# Patient Record
Sex: MALE | Race: WHITE | NOT HISPANIC OR LATINO
[De-identification: names, ages, dates, MRNs, and addresses within clinical notes are randomized per-mention and may not be internally consistent; named-entity substitution may affect disease eponyms.]

---

## 2020-09-22 ENCOUNTER — TRANSCRIPTION ENCOUNTER (OUTPATIENT)
Age: 65
End: 2020-09-22

## 2020-09-23 ENCOUNTER — INPATIENT (INPATIENT)
Facility: HOSPITAL | Age: 65
LOS: 6 days | Discharge: HOME | DRG: 330 | End: 2020-09-30
Attending: INTERNAL MEDICINE | Admitting: INTERNAL MEDICINE
Payer: MEDICAID

## 2020-09-23 VITALS
DIASTOLIC BLOOD PRESSURE: 88 MMHG | TEMPERATURE: 99 F | SYSTOLIC BLOOD PRESSURE: 149 MMHG | OXYGEN SATURATION: 99 % | HEART RATE: 87 BPM | RESPIRATION RATE: 16 BRPM

## 2020-09-23 DIAGNOSIS — K62.5 HEMORRHAGE OF ANUS AND RECTUM: ICD-10-CM

## 2020-09-23 LAB
ALBUMIN SERPL ELPH-MCNC: 3.7 G/DL — SIGNIFICANT CHANGE UP (ref 3.5–5)
ALP SERPL-CCNC: 65 U/L — SIGNIFICANT CHANGE UP (ref 40–120)
ALT FLD-CCNC: 19 U/L DA — SIGNIFICANT CHANGE UP (ref 10–60)
ANION GAP SERPL CALC-SCNC: 6 MMOL/L — SIGNIFICANT CHANGE UP (ref 5–17)
APTT BLD: 26.6 SEC — LOW (ref 27.5–35.5)
AST SERPL-CCNC: 16 U/L — SIGNIFICANT CHANGE UP (ref 10–40)
BASOPHILS # BLD AUTO: 0.02 K/UL — SIGNIFICANT CHANGE UP (ref 0–0.2)
BASOPHILS NFR BLD AUTO: 0.2 % — SIGNIFICANT CHANGE UP (ref 0–2)
BILIRUB SERPL-MCNC: 0.3 MG/DL — SIGNIFICANT CHANGE UP (ref 0.2–1.2)
BLD GP AB SCN SERPL QL: SIGNIFICANT CHANGE UP
BUN SERPL-MCNC: 12 MG/DL — SIGNIFICANT CHANGE UP (ref 7–18)
CALCIUM SERPL-MCNC: 9.2 MG/DL — SIGNIFICANT CHANGE UP (ref 8.4–10.5)
CHLORIDE SERPL-SCNC: 101 MMOL/L — SIGNIFICANT CHANGE UP (ref 96–108)
CO2 SERPL-SCNC: 26 MMOL/L — SIGNIFICANT CHANGE UP (ref 22–31)
CREAT SERPL-MCNC: 0.96 MG/DL — SIGNIFICANT CHANGE UP (ref 0.5–1.3)
EOSINOPHIL # BLD AUTO: 0.07 K/UL — SIGNIFICANT CHANGE UP (ref 0–0.5)
EOSINOPHIL NFR BLD AUTO: 0.8 % — SIGNIFICANT CHANGE UP (ref 0–6)
GLUCOSE BLDC GLUCOMTR-MCNC: 141 MG/DL — HIGH (ref 70–99)
GLUCOSE SERPL-MCNC: 165 MG/DL — HIGH (ref 70–99)
HCT VFR BLD CALC: 37.5 % — LOW (ref 39–50)
HGB BLD-MCNC: 12.6 G/DL — LOW (ref 13–17)
IMM GRANULOCYTES NFR BLD AUTO: 0.2 % — SIGNIFICANT CHANGE UP (ref 0–1.5)
INR BLD: 1.04 RATIO — SIGNIFICANT CHANGE UP (ref 0.88–1.16)
LYMPHOCYTES # BLD AUTO: 1.67 K/UL — SIGNIFICANT CHANGE UP (ref 1–3.3)
LYMPHOCYTES # BLD AUTO: 18.2 % — SIGNIFICANT CHANGE UP (ref 13–44)
MCHC RBC-ENTMCNC: 30.6 PG — SIGNIFICANT CHANGE UP (ref 27–34)
MCHC RBC-ENTMCNC: 33.6 GM/DL — SIGNIFICANT CHANGE UP (ref 32–36)
MCV RBC AUTO: 91 FL — SIGNIFICANT CHANGE UP (ref 80–100)
MONOCYTES # BLD AUTO: 0.83 K/UL — SIGNIFICANT CHANGE UP (ref 0–0.9)
MONOCYTES NFR BLD AUTO: 9.1 % — SIGNIFICANT CHANGE UP (ref 2–14)
NEUTROPHILS # BLD AUTO: 6.56 K/UL — SIGNIFICANT CHANGE UP (ref 1.8–7.4)
NEUTROPHILS NFR BLD AUTO: 71.5 % — SIGNIFICANT CHANGE UP (ref 43–77)
NRBC # BLD: 0 /100 WBCS — SIGNIFICANT CHANGE UP (ref 0–0)
PLATELET # BLD AUTO: 241 K/UL — SIGNIFICANT CHANGE UP (ref 150–400)
POTASSIUM SERPL-MCNC: 4.3 MMOL/L — SIGNIFICANT CHANGE UP (ref 3.5–5.3)
POTASSIUM SERPL-SCNC: 4.3 MMOL/L — SIGNIFICANT CHANGE UP (ref 3.5–5.3)
PROT SERPL-MCNC: 7.1 G/DL — SIGNIFICANT CHANGE UP (ref 6–8.3)
PROTHROM AB SERPL-ACNC: 12.1 SEC — SIGNIFICANT CHANGE UP (ref 10.6–13.6)
RBC # BLD: 4.12 M/UL — LOW (ref 4.2–5.8)
RBC # FLD: 13 % — SIGNIFICANT CHANGE UP (ref 10.3–14.5)
SARS-COV-2 RNA SPEC QL NAA+PROBE: SIGNIFICANT CHANGE UP
SODIUM SERPL-SCNC: 133 MMOL/L — LOW (ref 135–145)
WBC # BLD: 9.17 K/UL — SIGNIFICANT CHANGE UP (ref 3.8–10.5)
WBC # FLD AUTO: 9.17 K/UL — SIGNIFICANT CHANGE UP (ref 3.8–10.5)

## 2020-09-23 PROCEDURE — 74177 CT ABD & PELVIS W/CONTRAST: CPT | Mod: 26

## 2020-09-23 PROCEDURE — 99285 EMERGENCY DEPT VISIT HI MDM: CPT

## 2020-09-23 RX ORDER — SOD SULF/SODIUM/NAHCO3/KCL/PEG
4000 SOLUTION, RECONSTITUTED, ORAL ORAL ONCE
Refills: 0 | Status: DISCONTINUED | OUTPATIENT
Start: 2020-09-23 | End: 2020-09-23

## 2020-09-23 RX ORDER — SODIUM CHLORIDE 9 MG/ML
1000 INJECTION, SOLUTION INTRAVENOUS
Refills: 0 | Status: DISCONTINUED | OUTPATIENT
Start: 2020-09-23 | End: 2020-09-25

## 2020-09-23 RX ORDER — SODIUM CHLORIDE 9 MG/ML
1000 INJECTION INTRAMUSCULAR; INTRAVENOUS; SUBCUTANEOUS ONCE
Refills: 0 | Status: COMPLETED | OUTPATIENT
Start: 2020-09-23 | End: 2020-09-23

## 2020-09-23 RX ORDER — INSULIN LISPRO 100/ML
VIAL (ML) SUBCUTANEOUS EVERY 6 HOURS
Refills: 0 | Status: DISCONTINUED | OUTPATIENT
Start: 2020-09-23 | End: 2020-09-25

## 2020-09-23 RX ORDER — SOD SULF/SODIUM/NAHCO3/KCL/PEG
4000 SOLUTION, RECONSTITUTED, ORAL ORAL ONCE
Refills: 0 | Status: COMPLETED | OUTPATIENT
Start: 2020-09-23 | End: 2020-09-23

## 2020-09-23 RX ADMIN — SODIUM CHLORIDE 80 MILLILITER(S): 9 INJECTION, SOLUTION INTRAVENOUS at 22:10

## 2020-09-23 RX ADMIN — Medication 4000 MILLILITER(S): at 18:53

## 2020-09-23 RX ADMIN — Medication 20 MILLIGRAM(S): at 22:10

## 2020-09-23 RX ADMIN — SODIUM CHLORIDE 1000 MILLILITER(S): 9 INJECTION INTRAMUSCULAR; INTRAVENOUS; SUBCUTANEOUS at 10:47

## 2020-09-23 NOTE — H&P ADULT - PROBLEM SELECTOR PLAN 3
With hgb of 12.6  Likely iron def from bleed vs AOCD from underlying malignancy  Follow up iron panel

## 2020-09-23 NOTE — H&P ADULT - PROBLEM SELECTOR PLAN 1
Presented with BRBPR and found with mass in sigmoid colon. Also with anemia.  GI, Dr. Montez was consulted. Patient was started on bowel prep for colonoscopy.   - Colonoscopy scheduled for tomorrow 9/24/2020

## 2020-09-23 NOTE — CONSULT NOTE ADULT - SUBJECTIVE AND OBJECTIVE BOX
GI INITIAL CONSULT    HPI:   66 YO M with DM 2 presents with rectal bleeding that started this AM. Stated he had a BM this morning where he noted pink tinged blood in his stool, did not make nothing of it and went to work. At work while pt was urinating he noticed blood leaking from his rectum in small amounts and decided to come to the ER. Pt never had an EGD or colonoscopy, denies N/V/D, constipation.     PMH: DM 2  PSH: none     Meds:   MEDICATIONS  (STANDING):  bisacodyl 20 milliGRAM(s) Oral at bedtime  polyethylene glycol/electrolyte Solution. 4000 milliLiter(s) Oral once    SH: non contributory   FH: non contributory    ROS:  CONSTITUTIONAL: No fever, weight loss, or fatigue  EYES: No eye pain, visual disturbances, or discharge  ENT:  No difficulty hearing, tinnitus, vertigo; No sinus or throat pain  NECK: No pain or stiffness  RESPIRATORY: No cough, wheezing, chills or hemoptysis, shortness of Breath  CARDIOVASCULAR: No chest pain, palpitations, passing out, dizziness, or leg swelling  GASTROINTESTINAL: see HPI  GENITOURINARY: No dysuria, frequency, hematuria, or incontinence  NEUROLOGICAL: No headaches, memory loss, loss of strength, numbness, or tremors  SKIN: No itching, burning, rashes, or lesions   MUSCULOSKELETAL: No arthralgia, myalgia, or back pain.     Vitals:  Vital Signs Last 24 Hrs  T(C): 36.8 (23 Sep 2020 16:15), Max: 37.1 (23 Sep 2020 09:58)  T(F): 98.3 (23 Sep 2020 16:15), Max: 98.8 (23 Sep 2020 09:58)  HR: 84 (23 Sep 2020 16:15) (62 - 87)  BP: 142/95 (23 Sep 2020 16:16) (128/80 - 157/101)     Gen: NAD  CVS: S1/S2  Chest: CTABL  Abd: soft, non tender, non distended                          12.6   9.17  )-----------( 241      ( 23 Sep 2020 10:48 )             37.5   09-23    133<L>  |  101  |  12  ----------------------------<  165<H>  4.3   |  26  |  0.96    Ca    9.2      23 Sep 2020 10:48    TPro  7.1  /  Alb  3.7  /  TBili  0.3  /  DBili  x   /  AST  16  /  ALT  19  /  AlkPhos  65  09-23      Imaging:  EXAM:  CT ABDOMEN AND PELVIS IC                        PROCEDURE DATE:  09/23/2020      Clinical Indication: rectal bleeding    Technique: Axial multidetector CT images of the abdomen and pelvis are acquired following the administration of IV contrast (90 cc Omnipaque-350 administered, 10 cc discarded).    Comparison: None.    Findings: Limited sections through the lung bases demonstrate mild dependent pleural parenchymal changes bilaterally.    The liver, gallbladder, common bile duct, pancreas, spleen, and the right adrenal appear unremarkable. Nonspecific small 1.4 cm left adrenal nodule.    There is a 1.9 cm indeterminate hypodense lesion with a Hounsfield unit of 29 in the left kidney. There are other tiny hypodense lesions in the kidneys bilaterally, too small to characterize. No evidence for a ureteral calculus. No hydronephrosis.    The appendix appears normal. Apparent 4.2 cm lobulated masslike structure in the sigmoid colon (image 93 series 2). No bowel obstruction, or grossly thickened bowel wall.    Small fat-containing periumbilical hernia.    No evidence of free air, ascites, or enlarged lymph node.    The urinary bladder is within normal limits. The prostate appears grossly unremarkable.    Impression:    Apparent 4.2 cm lobulated masslike structure in the sigmoid colon. Colonoscopy is recommended to rule out a colon cancer.    Indeterminate 1.9 cm hypodense left renal lesion. If clinically indicated, renal ultrasound may be pursued for further evaluation.

## 2020-09-23 NOTE — ED PROVIDER NOTE - ATTENDING CONTRIBUTION TO CARE
I was physically present for the E/M service provided. I agree with above history, physical, and plan which I have reviewed and edited where appropriate. I was physically present for the key portions of the service provided.    Yesenia: 65 yr old male with hx of DM presents to ed c/o BRBPR, painless today x 2. noted in stool and when he wiped. no ac use, no vomiting, no abd pain ,no fever, no diarrhea, no easy brusing, no drug use.    *GEN:   comfortable, in no apparent distress, AOx3  *EYES:   PERRL, extra-occular movements intact, non-pale  *CV:   regular rate and rhythm, normal S1/S2, no murmur  *RESP:   clear to auscultation bilaterally, non-labored, speaking in full sentences  *ABD:   soft, non tender, no guarding, rectal per res  *SKIN:   dry, intact, no rash  *NEURO:   AOx3, no focal weakness or loss of sensation, gait normal, GCS 15    a/p: BRBPR r/o diverticulosis vs avm unlikely UGI. labs, ct, re-assess

## 2020-09-23 NOTE — ED PROVIDER NOTE - CLINICAL SUMMARY MEDICAL DECISION MAKING FREE TEXT BOX
65yoM pmh of DM c/o BRBPR since this morning not on blood thinners. Hemodynamically stable not currently bleeding. Likely 2/2 to internal hemorrhoids vs diverticular bleed. Unlikely UGI bleed based on HPI and exam and no other risk factors. No signs or symptoms of infection. Will start IVF, obtain basic labs, CT abdomen.

## 2020-09-23 NOTE — H&P ADULT - NSHPPHYSICALEXAM_GEN_ALL_CORE
General - Well appearing, well nourished, overweight, NAD with son at bedside  Eyes - PERRLA, EOM intact  ENT - Moist mucous membranes   Neck - No noticeable or palpable swelling, redness or rash around throat or on face  Lymph Nodes - No lymphadenopathy  Cardiovascular - +s1/s2 regular  Lungs - Clear to ascultation, no use of accessory muscles, no crackles or wheezes.  Skin - No rashes, skin warm and dry, no erythematous areas  Abdomen - Normal bowel sounds, abdomen soft and nontender  Rectal – No hemorroids noted, no blood on ASHLEY  Extremities - No edema, cyanosis or clubbing  Musculoskeletal - 5/5 strength, normal range of motion, no swollen or erythematous  joints.  Neurological – Alert and oriented x 3, CN 2-12 grossly intact.

## 2020-09-23 NOTE — CONSULT NOTE ADULT - ASSESSMENT
64 YO M present with rectal bleeding, never had a colonoscopy, CT scan today showed  4.2 cm lobulated masslike structure in the sigmoid colon, hgh concern for colon cancer    #rectal bleed, mass in sigmoid colon  - pt has a high concern for colon cancer   - get CEA level    - scheduled for colonoscopy tomorrow 9am  - clear diet for the remainder of day today   - 2 tab dulcolax tonight   - golytly tonight   - NPO after midnight

## 2020-09-23 NOTE — H&P ADULT - ASSESSMENT
Patient is a 65 year old male with PMHx of diabetes presenting with bright red blood per rectum and CT findings of 4.2cm mass in the sigmoid colon concerning for malignancy. Admitted for further workup including colonoscopy.

## 2020-09-23 NOTE — ED ADULT NURSE NOTE - NSIMPLEMENTINTERV_GEN_ALL_ED
Implemented All Universal Safety Interventions:  [place] to call system. Call bell, personal items and telephone within reach. Instruct patient to call for assistance. Room bathroom lighting operational. Non-slip footwear when patient is off stretcher. Physically safe environment: no spills, clutter or unnecessary equipment. Stretcher in lowest position, wheels locked, appropriate side rails in place.

## 2020-09-23 NOTE — ED PROVIDER NOTE - OBJECTIVE STATEMENT
65yoM PMH of DM c/o rectal bleeding. Pt had normal BM this morning, noticed blood streaks in the stool. Stool was normal in color, and patient did not have constipation or diarrhea. Patient went to work and when he was standing to urinate, noticed blood dripping from the rectum. He then decided to come to the hospital. This is the first time this has ever happened to him. He denies any lightheadedness, dizziness, fever, chills, nausea, vomiting, abdominal pain. Pt has never had a colonoscopy.

## 2020-09-23 NOTE — H&P ADULT - HISTORY OF PRESENT ILLNESS
Patient is a 65 year old male with PMHx of diabetes, extensive smoking history presenting with chief complaint of bright red blood per rectum. Patient first noticed blood when he was wiping after a bowel movement and noted red streaks. Later on in the day, patient noted red blood dripping from his rectum while urinating. Patient has no other complaints. Denied fever, chills cough, SOB, chest pain, abdominal pain, constipation, diarrhea, change in stool consistency, weight loss.    IN the ED, patient was hemodynamically stable, hemoglobin of 12.6. Patient noted to have a bowel movement in the ED that turned the toilet water red. CT abd was done that showed a mass in the sigmoid colon. GI, Dr. Montez was consulted and patient was started on bowel prep for colonoscopy.

## 2020-09-23 NOTE — H&P ADULT - PROBLEM SELECTOR PLAN 4
IMPROVE VTE score: 1  Will manage with: Hold anticoagulation in the setting of ongoing GI Bleed.    [ ] Previous VTE                                    3  [ ] Thrombophilia                                  2  [] Lower limb paralysis                        2  (unable to hold up >15 seconds)    [ ] Current Cancer (within 6 months)        2   [] Immobilization > 24 hrs                    1  [ ] ICU/CCU stay > 24 hrs                      1  [x] Age > 60                                         1

## 2020-09-23 NOTE — ED PROVIDER NOTE - PROGRESS NOTE DETAILS
Pt informed of CT scan results 4cm mass in the sigmoid colon. Pt initially wanted to go home 2/2 to not having insurance. Agreeable to staying after explaining risks of mass.

## 2020-09-23 NOTE — H&P ADULT - PROBLEM SELECTOR PLAN 2
Hx of DM on Metformin 1000mg BID.  - Hold oral medications, started on insulin sliding scale.  Follow up hgb a1c

## 2020-09-24 ENCOUNTER — RESULT REVIEW (OUTPATIENT)
Age: 65
End: 2020-09-24

## 2020-09-24 DIAGNOSIS — K63.89 OTHER SPECIFIED DISEASES OF INTESTINE: ICD-10-CM

## 2020-09-24 DIAGNOSIS — Z29.9 ENCOUNTER FOR PROPHYLACTIC MEASURES, UNSPECIFIED: ICD-10-CM

## 2020-09-24 DIAGNOSIS — D64.9 ANEMIA, UNSPECIFIED: ICD-10-CM

## 2020-09-24 DIAGNOSIS — E11.9 TYPE 2 DIABETES MELLITUS WITHOUT COMPLICATIONS: ICD-10-CM

## 2020-09-24 LAB
A1C WITH ESTIMATED AVERAGE GLUCOSE RESULT: 7.4 % — HIGH (ref 4–5.6)
ANION GAP SERPL CALC-SCNC: 6 MMOL/L — SIGNIFICANT CHANGE UP (ref 5–17)
BLD GP AB SCN SERPL QL: SIGNIFICANT CHANGE UP
BUN SERPL-MCNC: 16 MG/DL — SIGNIFICANT CHANGE UP (ref 7–18)
CALCIUM SERPL-MCNC: 9.1 MG/DL — SIGNIFICANT CHANGE UP (ref 8.4–10.5)
CEA SERPL-MCNC: 2.9 NG/ML — SIGNIFICANT CHANGE UP (ref 0–3.8)
CHLORIDE SERPL-SCNC: 106 MMOL/L — SIGNIFICANT CHANGE UP (ref 96–108)
CO2 SERPL-SCNC: 30 MMOL/L — SIGNIFICANT CHANGE UP (ref 22–31)
CREAT SERPL-MCNC: 0.9 MG/DL — SIGNIFICANT CHANGE UP (ref 0.5–1.3)
ESTIMATED AVERAGE GLUCOSE: 166 MG/DL — HIGH (ref 68–114)
FERRITIN SERPL-MCNC: 110 NG/ML — SIGNIFICANT CHANGE UP (ref 30–400)
GLUCOSE BLDC GLUCOMTR-MCNC: 108 MG/DL — HIGH (ref 70–99)
GLUCOSE BLDC GLUCOMTR-MCNC: 129 MG/DL — HIGH (ref 70–99)
GLUCOSE BLDC GLUCOMTR-MCNC: 140 MG/DL — HIGH (ref 70–99)
GLUCOSE BLDC GLUCOMTR-MCNC: 143 MG/DL — HIGH (ref 70–99)
GLUCOSE BLDC GLUCOMTR-MCNC: 152 MG/DL — HIGH (ref 70–99)
GLUCOSE BLDC GLUCOMTR-MCNC: 165 MG/DL — HIGH (ref 70–99)
GLUCOSE SERPL-MCNC: 147 MG/DL — HIGH (ref 70–99)
HCT VFR BLD CALC: 30.7 % — LOW (ref 39–50)
HCT VFR BLD CALC: 31.7 % — LOW (ref 39–50)
HCT VFR BLD CALC: 35.2 % — LOW (ref 39–50)
HCT VFR BLD CALC: 35.8 % — LOW (ref 39–50)
HCV AB S/CO SERPL IA: 0.42 S/CO — SIGNIFICANT CHANGE UP (ref 0–0.99)
HCV AB SERPL-IMP: SIGNIFICANT CHANGE UP
HGB BLD-MCNC: 10.4 G/DL — LOW (ref 13–17)
HGB BLD-MCNC: 10.6 G/DL — LOW (ref 13–17)
HGB BLD-MCNC: 11.3 G/DL — LOW (ref 13–17)
HGB BLD-MCNC: 11.6 G/DL — LOW (ref 13–17)
IRON SATN MFR SERPL: 112 UG/DL — SIGNIFICANT CHANGE UP (ref 65–170)
IRON SATN MFR SERPL: 35 % — SIGNIFICANT CHANGE UP (ref 20–55)
MAGNESIUM SERPL-MCNC: 2.2 MG/DL — SIGNIFICANT CHANGE UP (ref 1.6–2.6)
MCHC RBC-ENTMCNC: 29.6 PG — SIGNIFICANT CHANGE UP (ref 27–34)
MCHC RBC-ENTMCNC: 29.6 PG — SIGNIFICANT CHANGE UP (ref 27–34)
MCHC RBC-ENTMCNC: 29.9 PG — SIGNIFICANT CHANGE UP (ref 27–34)
MCHC RBC-ENTMCNC: 31.1 PG — SIGNIFICANT CHANGE UP (ref 27–34)
MCHC RBC-ENTMCNC: 32.1 GM/DL — SIGNIFICANT CHANGE UP (ref 32–36)
MCHC RBC-ENTMCNC: 32.4 GM/DL — SIGNIFICANT CHANGE UP (ref 32–36)
MCHC RBC-ENTMCNC: 32.8 GM/DL — SIGNIFICANT CHANGE UP (ref 32–36)
MCHC RBC-ENTMCNC: 34.5 GM/DL — SIGNIFICANT CHANGE UP (ref 32–36)
MCV RBC AUTO: 90 FL — SIGNIFICANT CHANGE UP (ref 80–100)
MCV RBC AUTO: 91.1 FL — SIGNIFICANT CHANGE UP (ref 80–100)
MCV RBC AUTO: 91.3 FL — SIGNIFICANT CHANGE UP (ref 80–100)
MCV RBC AUTO: 92.1 FL — SIGNIFICANT CHANGE UP (ref 80–100)
NRBC # BLD: 0 /100 WBCS — SIGNIFICANT CHANGE UP (ref 0–0)
PHOSPHATE SERPL-MCNC: 3.2 MG/DL — SIGNIFICANT CHANGE UP (ref 2.5–4.5)
PLATELET # BLD AUTO: 212 K/UL — SIGNIFICANT CHANGE UP (ref 150–400)
PLATELET # BLD AUTO: 223 K/UL — SIGNIFICANT CHANGE UP (ref 150–400)
PLATELET # BLD AUTO: 244 K/UL — SIGNIFICANT CHANGE UP (ref 150–400)
PLATELET # BLD AUTO: 248 K/UL — SIGNIFICANT CHANGE UP (ref 150–400)
POTASSIUM SERPL-MCNC: 4.6 MMOL/L — SIGNIFICANT CHANGE UP (ref 3.5–5.3)
POTASSIUM SERPL-SCNC: 4.6 MMOL/L — SIGNIFICANT CHANGE UP (ref 3.5–5.3)
RBC # BLD: 3.41 M/UL — LOW (ref 4.2–5.8)
RBC # BLD: 3.48 M/UL — LOW (ref 4.2–5.8)
RBC # BLD: 3.82 M/UL — LOW (ref 4.2–5.8)
RBC # BLD: 3.92 M/UL — LOW (ref 4.2–5.8)
RBC # FLD: 13 % — SIGNIFICANT CHANGE UP (ref 10.3–14.5)
RBC # FLD: 13.1 % — SIGNIFICANT CHANGE UP (ref 10.3–14.5)
RBC # FLD: 13.1 % — SIGNIFICANT CHANGE UP (ref 10.3–14.5)
RBC # FLD: 13.2 % — SIGNIFICANT CHANGE UP (ref 10.3–14.5)
SODIUM SERPL-SCNC: 142 MMOL/L — SIGNIFICANT CHANGE UP (ref 135–145)
TIBC SERPL-MCNC: 324 UG/DL — SIGNIFICANT CHANGE UP (ref 250–450)
UIBC SERPL-MCNC: 212 UG/DL — SIGNIFICANT CHANGE UP (ref 110–370)
WBC # BLD: 13.31 K/UL — HIGH (ref 3.8–10.5)
WBC # BLD: 14.37 K/UL — HIGH (ref 3.8–10.5)
WBC # BLD: 7.01 K/UL — SIGNIFICANT CHANGE UP (ref 3.8–10.5)
WBC # BLD: 7.07 K/UL — SIGNIFICANT CHANGE UP (ref 3.8–10.5)
WBC # FLD AUTO: 13.31 K/UL — HIGH (ref 3.8–10.5)
WBC # FLD AUTO: 14.37 K/UL — HIGH (ref 3.8–10.5)
WBC # FLD AUTO: 7.01 K/UL — SIGNIFICANT CHANGE UP (ref 3.8–10.5)
WBC # FLD AUTO: 7.07 K/UL — SIGNIFICANT CHANGE UP (ref 3.8–10.5)

## 2020-09-24 PROCEDURE — 88305 TISSUE EXAM BY PATHOLOGIST: CPT | Mod: 26

## 2020-09-24 RX ORDER — NEOMYCIN SULFATE 500 MG/1
1000 TABLET ORAL ONCE
Refills: 0 | Status: COMPLETED | OUTPATIENT
Start: 2020-09-24 | End: 2020-09-24

## 2020-09-24 RX ORDER — CIPROFLOXACIN LACTATE 400MG/40ML
VIAL (ML) INTRAVENOUS
Refills: 0 | Status: DISCONTINUED | OUTPATIENT
Start: 2020-09-24 | End: 2020-09-25

## 2020-09-24 RX ORDER — SODIUM CHLORIDE 9 MG/ML
1000 INJECTION, SOLUTION INTRAVENOUS
Refills: 0 | Status: DISCONTINUED | OUTPATIENT
Start: 2020-09-24 | End: 2020-09-25

## 2020-09-24 RX ORDER — CIPROFLOXACIN LACTATE 400MG/40ML
400 VIAL (ML) INTRAVENOUS EVERY 12 HOURS
Refills: 0 | Status: DISCONTINUED | OUTPATIENT
Start: 2020-09-25 | End: 2020-09-25

## 2020-09-24 RX ORDER — CIPROFLOXACIN LACTATE 400MG/40ML
400 VIAL (ML) INTRAVENOUS ONCE
Refills: 0 | Status: COMPLETED | OUTPATIENT
Start: 2020-09-24 | End: 2020-09-24

## 2020-09-24 RX ORDER — ERYTHROMYCIN ETHYLSUCCINATE 400 MG
1000 TABLET ORAL ONCE
Refills: 0 | Status: COMPLETED | OUTPATIENT
Start: 2020-09-24 | End: 2020-09-24

## 2020-09-24 RX ORDER — METRONIDAZOLE 500 MG
500 TABLET ORAL ONCE
Refills: 0 | Status: COMPLETED | OUTPATIENT
Start: 2020-09-24 | End: 2020-09-24

## 2020-09-24 RX ORDER — METRONIDAZOLE 500 MG
500 TABLET ORAL EVERY 8 HOURS
Refills: 0 | Status: DISCONTINUED | OUTPATIENT
Start: 2020-09-24 | End: 2020-09-25

## 2020-09-24 RX ORDER — SODIUM CHLORIDE 9 MG/ML
1000 INJECTION, SOLUTION INTRAVENOUS
Refills: 0 | Status: DISCONTINUED | OUTPATIENT
Start: 2020-09-24 | End: 2020-09-24

## 2020-09-24 RX ORDER — METRONIDAZOLE 500 MG
TABLET ORAL
Refills: 0 | Status: DISCONTINUED | OUTPATIENT
Start: 2020-09-24 | End: 2020-09-25

## 2020-09-24 RX ADMIN — NEOMYCIN SULFATE 1000 MILLIGRAM(S): 500 TABLET ORAL at 22:34

## 2020-09-24 RX ADMIN — NEOMYCIN SULFATE 1000 MILLIGRAM(S): 500 TABLET ORAL at 23:42

## 2020-09-24 RX ADMIN — Medication 100 MILLIGRAM(S): at 21:11

## 2020-09-24 RX ADMIN — Medication 1: at 12:58

## 2020-09-24 RX ADMIN — Medication 200 MILLIGRAM(S): at 13:14

## 2020-09-24 RX ADMIN — Medication 100 MILLIGRAM(S): at 15:05

## 2020-09-24 RX ADMIN — Medication 1000 MILLIGRAM(S): at 23:42

## 2020-09-24 RX ADMIN — Medication 20 MILLIGRAM(S): at 21:12

## 2020-09-24 RX ADMIN — Medication 1000 MILLIGRAM(S): at 22:36

## 2020-09-24 RX ADMIN — SODIUM CHLORIDE 80 MILLILITER(S): 9 INJECTION, SOLUTION INTRAVENOUS at 12:56

## 2020-09-24 NOTE — PROGRESS NOTE ADULT - PROBLEM SELECTOR PLAN 1
Presented with BRBPR and found with mass in sigmoid colon, sp colonoscopy today  9/24/2020  surgery consulted  started on CIpro and flagyl   for colon resection tomorrow  keep NPO  continue IV fluids   Monitor CBC q 6 and transfuse as needed  f/u CEA level

## 2020-09-24 NOTE — CHART NOTE - NSCHARTNOTEFT_GEN_A_CORE
COLONOSCOPY    Scope #: [id number]  Start time: 09.49  Cecum time: 09.57  End time: 10.31  Withdrawal time: 34 min    -Informed consent obtained from patient prior to exam.     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 The stalk of the mass was injected with 1 cc of 1:10,000 epinephrine. The head of the mass was injected with 5 cc of 1:10,000 epinephrine. An attempted was made to snare the mass at the stalk with a 25-mm snare. After cautery the snare did not cut through the stalk; the mass may have been partially transected on the posterior end by the snare. The snare subsequently was lodged in the mass and was unable to be removed. The distal end of the snare was cut and ~35 cm of the snare device was left inside the colon as the loop of the snare was entangled in the polyp head. The mass head was biopsied and sent to pathology. The area distal to the mass was tatooed with 5 cc of SPOT ink.    DESCENDING COLON:  -Normal    TRANSVERSE COLON:  -Normal    ASCENDING COLON:  -Normal    CECUM:  -Normal    TERMINAL ILEUM:  -Normal    The patient tolerated the procedure well.    FINDINGS:  -~4-cm pedunculated mass in the sigmoid colon  -Loop of the snare is entangled in the polyp head and ~35-cm of the snare was left inside the patient  -Mass biopsied  -Area proximal to the mass was tatooed    PLAN:  1) Keep patient NPO  2) Check CBC q6h and transfuse if Hb <7  3) Surgical consult called. Plan for sigmoid resection tomorrow

## 2020-09-24 NOTE — PROGRESS NOTE ADULT - ASSESSMENT
Patient is a 65 year old male with PMHx of diabetes, extensive smoking history presenting with chief complaint of bright red blood per rectum.  On admission hemoglobin of 12.6.  CT abd showed  4.2 cm lobulated masslike structure in the sigmoid colon, Pt was evaluated by GI, Dr. Montez was consulted and pt underwent colonoscopy today 9/24.    Surgery consulted. Plan for sigmoid resection tomorrow. NPO. Seen at bedside, pt states feeling well, no abdominal pain, reports bright red blood per rectum. Hg 11.3.

## 2020-09-24 NOTE — CONSULT NOTE ADULT - ASSESSMENT
65 y.o. M with large sigmoid mass s/p colonoscopy    -Plan for lap assisted sigmoid colon resection 9/25/2020  -Keep NPO  -IVF  -2U PRBC on hold for OR  -Medical optimization

## 2020-09-24 NOTE — PROGRESS NOTE ADULT - PROBLEM SELECTOR PLAN 3
hgb of 12.6  Likely iron def from bleed vs AOCD from underlying malignancy  Follow up iron panel  monitor CBC and transfuse as needed

## 2020-09-24 NOTE — CONSULT NOTE ADULT - SUBJECTIVE AND OBJECTIVE BOX
Patient is a 65y old  Male who presents with a chief complaint of Bright red blood per rectum (24 Sep 2020 12:19)      HPI  Patient is a 65 year old male with PMHx of diabetes, extensive smoking history presenting with chief complaint of bright red blood per rectum. Patient first noticed blood when he was wiping after a bowel movement and noted red streaks. Later on in the day, patient noted red blood dripping from his rectum while urinating. Patient has no other complaints. Denied fever, chills cough, SOB, chest pain, abdominal pain, constipation, diarrhea, change in stool consistency, weight loss.    IN the ED, patient was hemodynamically stable, hemoglobin of 12.6. Patient noted to have a bowel movement in the ED that turned the toilet water red. CT abd was done that showed a mass in the sigmoid colon. GI, Dr. Montez was consulted and patient was started on bowel prep for colonoscopy.     Surgery called after colonoscopy. Large pedunculated sigmoid mass identified. Attempts to snare mass unsuccessful and snare stuck at head of mass. Upon intraprocedural consult, snare was clipped off at level of anus. Pt currently back in room. Post procedure H/H stable.    PAST MEDICAL & SURGICAL HISTORY:  Diabetes mellitus    MEDICATIONS  (STANDING):  bisacodyl 20 milliGRAM(s) Oral at bedtime  ciprofloxacin   IVPB      insulin lispro (HumaLOG) corrective regimen sliding scale   SubCutaneous every 6 hours  lactated ringers. 1000 milliLiter(s) (80 mL/Hr) IV Continuous <Continuous>  lactated ringers. 1000 milliLiter(s) (80 mL/Hr) IV Continuous <Continuous>  metroNIDAZOLE  IVPB      metroNIDAZOLE  IVPB 500 milliGRAM(s) IV Intermittent once  metroNIDAZOLE  IVPB 500 milliGRAM(s) IV Intermittent every 8 hours    Allergies    No Known Allergies    Intolerances    Vital Signs Last 24 Hrs  T(C): 36.9 (24 Sep 2020 11:37), Max: 37.6 (23 Sep 2020 18:48)  T(F): 98.5 (24 Sep 2020 11:37), Max: 99.6 (23 Sep 2020 18:48)  HR: 16 (24 Sep 2020 11:37) (16 - 84)  BP: 105/86 (24 Sep 2020 11:37) (105/86 - 157/101)  BP(mean): --  RR: 16 (24 Sep 2020 11:37) (16 - 18)  SpO2: 99% (24 Sep 2020 11:37) (94% - 99%)    Physical:  Gen: A&Ox3. NAD. Obese  Abd: Soft ND, NT    LABS:                        11.6   7.07  )-----------( 244      ( 24 Sep 2020 12:51 )             35.8              09-24    142  |  106  |  16  ----------------------------<  147<H>  4.6   |  30  |  0.90    Ca    9.1      24 Sep 2020 06:56  Phos  3.2     09-24  Mg     2.2     09-24    TPro  7.1  /  Alb  3.7  /  TBili  0.3  /  DBili  x   /  AST  16  /  ALT  19  /  AlkPhos  65  09-23            PT/INR - ( 23 Sep 2020 10:48 )   PT: 12.1 sec;   INR: 1.04 ratio         PTT - ( 23 Sep 2020 10:48 )  PTT:26.6 sec      RADIOLOGY & ADDITIONAL STUDIES:  < from: CT Abdomen and Pelvis w/ IV Cont (09.23.20 @ 13:26) >  Impression:    Apparent 4.2 cm lobulated masslike structure in the sigmoid colon. Colonoscopy is recommended to rule out a colon cancer.    Indeterminate 1.9 cm hypodense left renal lesion. If clinically indicated, renal ultrasound may be pursued for further evaluation.    < end of copied text >

## 2020-09-24 NOTE — PROGRESS NOTE ADULT - PROBLEM SELECTOR PLAN 4
IMPROVE VTE score: 1   Hold anticoagulation in the setting of ongoing GI Bleed.    [ ] Previous VTE                                    3  [ ] Thrombophilia                                  2  [] Lower limb paralysis                        2  (unable to hold up >15 seconds)    [ ] Current Cancer (within 6 months)        2   [] Immobilization > 24 hrs                    1  [ ] ICU/CCU stay > 24 hrs                      1  [x] Age > 60                                         1

## 2020-09-24 NOTE — CONSULT NOTE ADULT - ATTENDING COMMENTS
Case d/w me. Agree with above.
Agree with above,  Options risks and benefits explained.  Questions answered.  Laparoscopic assisted sigmoid colon resection in AM

## 2020-09-25 ENCOUNTER — RESULT REVIEW (OUTPATIENT)
Age: 65
End: 2020-09-25

## 2020-09-25 DIAGNOSIS — Z90.49 ACQUIRED ABSENCE OF OTHER SPECIFIED PARTS OF DIGESTIVE TRACT: ICD-10-CM

## 2020-09-25 DIAGNOSIS — Z87.891 PERSONAL HISTORY OF NICOTINE DEPENDENCE: ICD-10-CM

## 2020-09-25 DIAGNOSIS — R10.9 UNSPECIFIED ABDOMINAL PAIN: ICD-10-CM

## 2020-09-25 DIAGNOSIS — K62.5 HEMORRHAGE OF ANUS AND RECTUM: ICD-10-CM

## 2020-09-25 LAB
ANION GAP SERPL CALC-SCNC: 4 MMOL/L — LOW (ref 5–17)
APTT BLD: 27 SEC — LOW (ref 27.5–35.5)
BUN SERPL-MCNC: 12 MG/DL — SIGNIFICANT CHANGE UP (ref 7–18)
CALCIUM SERPL-MCNC: 8.4 MG/DL — SIGNIFICANT CHANGE UP (ref 8.4–10.5)
CHLORIDE SERPL-SCNC: 107 MMOL/L — SIGNIFICANT CHANGE UP (ref 96–108)
CO2 SERPL-SCNC: 28 MMOL/L — SIGNIFICANT CHANGE UP (ref 22–31)
CREAT SERPL-MCNC: 0.94 MG/DL — SIGNIFICANT CHANGE UP (ref 0.5–1.3)
GLUCOSE BLDC GLUCOMTR-MCNC: 104 MG/DL — HIGH (ref 70–99)
GLUCOSE BLDC GLUCOMTR-MCNC: 127 MG/DL — HIGH (ref 70–99)
GLUCOSE BLDC GLUCOMTR-MCNC: 139 MG/DL — HIGH (ref 70–99)
GLUCOSE BLDC GLUCOMTR-MCNC: 163 MG/DL — HIGH (ref 70–99)
GLUCOSE BLDC GLUCOMTR-MCNC: 165 MG/DL — HIGH (ref 70–99)
GLUCOSE SERPL-MCNC: 155 MG/DL — HIGH (ref 70–99)
HCT VFR BLD CALC: 29.6 % — LOW (ref 39–50)
HGB BLD-MCNC: 10.2 G/DL — LOW (ref 13–17)
INR BLD: 1.3 RATIO — HIGH (ref 0.88–1.16)
MCHC RBC-ENTMCNC: 31.3 PG — SIGNIFICANT CHANGE UP (ref 27–34)
MCHC RBC-ENTMCNC: 34.5 GM/DL — SIGNIFICANT CHANGE UP (ref 32–36)
MCV RBC AUTO: 90.8 FL — SIGNIFICANT CHANGE UP (ref 80–100)
NRBC # BLD: 0 /100 WBCS — SIGNIFICANT CHANGE UP (ref 0–0)
PLATELET # BLD AUTO: 202 K/UL — SIGNIFICANT CHANGE UP (ref 150–400)
POTASSIUM SERPL-MCNC: 4.1 MMOL/L — SIGNIFICANT CHANGE UP (ref 3.5–5.3)
POTASSIUM SERPL-SCNC: 4.1 MMOL/L — SIGNIFICANT CHANGE UP (ref 3.5–5.3)
PROTHROM AB SERPL-ACNC: 15 SEC — HIGH (ref 10.6–13.6)
RBC # BLD: 3.26 M/UL — LOW (ref 4.2–5.8)
RBC # FLD: 13 % — SIGNIFICANT CHANGE UP (ref 10.3–14.5)
SARS-COV-2 IGG SERPL QL IA: NEGATIVE — SIGNIFICANT CHANGE UP
SARS-COV-2 IGM SERPL IA-ACNC: 0.28 INDEX — SIGNIFICANT CHANGE UP
SODIUM SERPL-SCNC: 139 MMOL/L — SIGNIFICANT CHANGE UP (ref 135–145)
WBC # BLD: 10.74 K/UL — HIGH (ref 3.8–10.5)
WBC # FLD AUTO: 10.74 K/UL — HIGH (ref 3.8–10.5)

## 2020-09-25 PROCEDURE — 44204 LAPARO PARTIAL COLECTOMY: CPT | Mod: AS

## 2020-09-25 PROCEDURE — 51702 INSERT TEMP BLADDER CATH: CPT | Mod: 59

## 2020-09-25 PROCEDURE — 99221 1ST HOSP IP/OBS SF/LOW 40: CPT

## 2020-09-25 PROCEDURE — 88304 TISSUE EXAM BY PATHOLOGIST: CPT | Mod: 26

## 2020-09-25 PROCEDURE — 44204 LAPARO PARTIAL COLECTOMY: CPT

## 2020-09-25 PROCEDURE — 88309 TISSUE EXAM BY PATHOLOGIST: CPT | Mod: 26

## 2020-09-25 RX ORDER — DEXTROSE MONOHYDRATE, SODIUM CHLORIDE, AND POTASSIUM CHLORIDE 50; .745; 4.5 G/1000ML; G/1000ML; G/1000ML
1000 INJECTION, SOLUTION INTRAVENOUS
Refills: 0 | Status: DISCONTINUED | OUTPATIENT
Start: 2020-09-25 | End: 2020-09-30

## 2020-09-25 RX ORDER — DEXTROSE 50 % IN WATER 50 %
12.5 SYRINGE (ML) INTRAVENOUS ONCE
Refills: 0 | Status: DISCONTINUED | OUTPATIENT
Start: 2020-09-25 | End: 2020-09-30

## 2020-09-25 RX ORDER — HYDROMORPHONE HYDROCHLORIDE 2 MG/ML
0.5 INJECTION INTRAMUSCULAR; INTRAVENOUS; SUBCUTANEOUS
Refills: 0 | Status: DISCONTINUED | OUTPATIENT
Start: 2020-09-25 | End: 2020-09-25

## 2020-09-25 RX ORDER — FENTANYL CITRATE 50 UG/ML
25 INJECTION INTRAVENOUS
Refills: 0 | Status: DISCONTINUED | OUTPATIENT
Start: 2020-09-25 | End: 2020-09-25

## 2020-09-25 RX ORDER — DEXTROSE 50 % IN WATER 50 %
25 SYRINGE (ML) INTRAVENOUS ONCE
Refills: 0 | Status: DISCONTINUED | OUTPATIENT
Start: 2020-09-25 | End: 2020-09-30

## 2020-09-25 RX ORDER — PANTOPRAZOLE SODIUM 20 MG/1
40 TABLET, DELAYED RELEASE ORAL DAILY
Refills: 0 | Status: DISCONTINUED | OUTPATIENT
Start: 2020-09-25 | End: 2020-09-30

## 2020-09-25 RX ORDER — SENNA PLUS 8.6 MG/1
2 TABLET ORAL AT BEDTIME
Refills: 0 | Status: DISCONTINUED | OUTPATIENT
Start: 2020-09-25 | End: 2020-09-25

## 2020-09-25 RX ORDER — DEXTROSE 50 % IN WATER 50 %
15 SYRINGE (ML) INTRAVENOUS ONCE
Refills: 0 | Status: DISCONTINUED | OUTPATIENT
Start: 2020-09-25 | End: 2020-09-30

## 2020-09-25 RX ORDER — HEPARIN SODIUM 5000 [USP'U]/ML
5000 INJECTION INTRAVENOUS; SUBCUTANEOUS EVERY 8 HOURS
Refills: 0 | Status: DISCONTINUED | OUTPATIENT
Start: 2020-09-26 | End: 2020-09-30

## 2020-09-25 RX ORDER — OXYCODONE HYDROCHLORIDE 5 MG/1
5 TABLET ORAL EVERY 4 HOURS
Refills: 0 | Status: DISCONTINUED | OUTPATIENT
Start: 2020-09-25 | End: 2020-09-28

## 2020-09-25 RX ORDER — GLUCAGON INJECTION, SOLUTION 0.5 MG/.1ML
1 INJECTION, SOLUTION SUBCUTANEOUS ONCE
Refills: 0 | Status: DISCONTINUED | OUTPATIENT
Start: 2020-09-25 | End: 2020-09-30

## 2020-09-25 RX ORDER — ACETAMINOPHEN 500 MG
650 TABLET ORAL EVERY 6 HOURS
Refills: 0 | Status: DISCONTINUED | OUTPATIENT
Start: 2020-09-25 | End: 2020-09-30

## 2020-09-25 RX ORDER — SODIUM CHLORIDE 9 MG/ML
1000 INJECTION, SOLUTION INTRAVENOUS
Refills: 0 | Status: DISCONTINUED | OUTPATIENT
Start: 2020-09-25 | End: 2020-09-30

## 2020-09-25 RX ORDER — INSULIN LISPRO 100/ML
VIAL (ML) SUBCUTANEOUS
Refills: 0 | Status: DISCONTINUED | OUTPATIENT
Start: 2020-09-25 | End: 2020-09-30

## 2020-09-25 RX ORDER — HYDROMORPHONE HYDROCHLORIDE 2 MG/ML
1 INJECTION INTRAMUSCULAR; INTRAVENOUS; SUBCUTANEOUS EVERY 4 HOURS
Refills: 0 | Status: DISCONTINUED | OUTPATIENT
Start: 2020-09-25 | End: 2020-09-25

## 2020-09-25 RX ADMIN — SODIUM CHLORIDE 90 MILLILITER(S): 9 INJECTION, SOLUTION INTRAVENOUS at 04:12

## 2020-09-25 RX ADMIN — OXYCODONE HYDROCHLORIDE 5 MILLIGRAM(S): 5 TABLET ORAL at 15:00

## 2020-09-25 RX ADMIN — OXYCODONE HYDROCHLORIDE 5 MILLIGRAM(S): 5 TABLET ORAL at 14:25

## 2020-09-25 RX ADMIN — Medication 200 MILLIGRAM(S): at 05:13

## 2020-09-25 RX ADMIN — Medication 100 MILLIGRAM(S): at 05:12

## 2020-09-25 RX ADMIN — Medication 2: at 12:46

## 2020-09-25 NOTE — PROGRESS NOTE ADULT - ASSESSMENT
65M w/malignant-appearing colon mass now s/p sigmoid resection.  -f/u pathology results  -pt will need oncology referral (can call Dr. Brooklynn Ayala or her team)  -further care per primary team  -pt can f/u w/me in the office after discharge

## 2020-09-25 NOTE — PROVIDER CONTACT NOTE (MEDICATION) - SITUATION
Stat and 11pm dose of neomycin and erythromycin ordered, confirmed with RUBEN Mullen that both doses of each medication are to be given.

## 2020-09-25 NOTE — CONSULT NOTE ADULT - ASSESSMENT
Data Detail Level: Printer-Friendly View Extended View   Confidential Drug Utilization Report  Search Terms: Edouard Kelly, 1955   Search Date: 09/25/2020 12:41:27 PM   The Drug Utilization Report below displays all of the controlled substance prescriptions, if any, that your patient has filled in the last twelve months. The information displayed on this report is compiled from pharmacy submissions to the Department, and accurately reflects the information as submitted by the pharmacies.  This report was requested by: Keren Anderson | Reference #: [id number]   There are no results for the search terms that you entered.

## 2020-09-25 NOTE — PROGRESS NOTE ADULT - ASSESSMENT
65y Male s/p laparoscopic assisted sigmoid colon resection 9/25, stable      -Pain control PRN  -C/w Home meds  -IVF  -NPO  -Incentive spirometer  -OOB/Ambulate  -DVT ppx 65y Male s/p laparoscopic assisted sigmoid colon resection 9/25, stable      -Pain control PRN  -C/w Home meds  -IVF  -NPO  -C/w Hubbard Catheter   -Incentive spirometer  -OOB/Ambulate  -DVT ppx

## 2020-09-25 NOTE — CONSULT NOTE ADULT - SUBJECTIVE AND OBJECTIVE BOX
Source of information: CHUY WALSH, Chart review  Patient language: English  : n/a    HPI:  Patient is a 65 year old male with PMHx of diabetes, extensive smoking history presenting with chief complaint of bright red blood per rectum. Patient first noticed blood when he was wiping after a bowel movement and noted red streaks. Later on in the day, patient noted red blood dripping from his rectum while urinating. Patient has no other complaints. Denied fever, chills cough, SOB, chest pain, abdominal pain, constipation, diarrhea, change in stool consistency, weight loss.    IN the ED, patient was hemodynamically stable, hemoglobin of 12.6. Patient noted to have a bowel movement in the ED that turned the toilet water red. CT abd was done that showed a mass in the sigmoid colon. GI, Dr. Montez was consulted and patient was started on bowel prep for colonoscopy.  (23 Sep 2020 23:55)      Patient is a 65y old  Male who presents with a chief complaint of bright red blood per rectum. Pt is s/p partial sigmoid laparoscopic colectomy 9/25, POD #0.  Pt seen and examined at bedside. Reports mild abdominal soreness, tolerable at this time. pain score, reports soreness is exacerbated by movement and palpation. Pt is NPO except medications. Denies lethargy, nausea, vomiting, constipation, itchiness. Reports having bright red blood per rectum with BM at home. Patient stated goal for pain control: to be able to take deep breaths, get out of bed to chair and ambulate with tolerable pain control. Pt denies taking medications for pain at home.     PAST MEDICAL & SURGICAL HISTORY:  Diabetes mellitus    Social History:  smoked 2packs a year for 20-25 years. Has quit smoking for many years. Occasional drinker. (23 Sep 2020 23:55)   [X ] Denies illicit drug use     Allergies    No Known Allergies    MEDICATIONS  (STANDING):  dextrose 5%. 1000 milliLiter(s) (50 mL/Hr) IV Continuous <Continuous>  dextrose 50% Injectable 12.5 Gram(s) IV Push once  dextrose 50% Injectable 25 Gram(s) IV Push once  dextrose 50% Injectable 25 Gram(s) IV Push once  insulin lispro (HumaLOG) corrective regimen sliding scale   SubCutaneous three times a day before meals  pantoprazole  Injectable 40 milliGRAM(s) IV Push daily  sodium chloride 0.9% with potassium chloride 20 mEq/L 1000 milliLiter(s) (125 mL/Hr) IV Continuous <Continuous>    MEDICATIONS  (PRN):  acetaminophen   Tablet .. 650 milliGRAM(s) Oral every 6 hours PRN Moderate Pain (4 - 6)  dextrose 40% Gel 15 Gram(s) Oral once PRN Blood Glucose LESS THAN 70 milliGRAM(s)/deciliter  glucagon  Injectable 1 milliGRAM(s) IntraMuscular once PRN Glucose LESS THAN 70 milligrams/deciliter  oxyCODONE    IR 5 milliGRAM(s) Oral every 4 hours PRN Severe Pain (7 - 10)      Vital Signs Last 24 Hrs  T(C): 36.4 (25 Sep 2020 12:21), Max: 36.8 (25 Sep 2020 08:30)  T(F): 97.6 (25 Sep 2020 12:21), Max: 98.2 (25 Sep 2020 08:30)  HR: 68 (25 Sep 2020 12:21) (68 - 91)  BP: 124/67 (25 Sep 2020 12:21) (92/80 - 142/75)  BP(mean): 92 (25 Sep 2020 11:46) (85 - 93)  RR: 18 (25 Sep 2020 12:21) (11 - 22)  SpO2: 99% (25 Sep 2020 12:21) (95% - 100%)    LABS: Reviewed.                          10.2   10.74 )-----------( 202      ( 25 Sep 2020 07:21 )             29.6     09-25    139  |  107  |  12  ----------------------------<  155<H>  4.1   |  28  |  0.94    Ca    8.4      25 Sep 2020 08:24  Phos  3.2     09-24  Mg     2.2     09-24      PT/INR - ( 25 Sep 2020 07:21 )   PT: 15.0 sec;   INR: 1.30 ratio         PTT - ( 25 Sep 2020 07:21 )  PTT:27.0 sec      CAPILLARY BLOOD GLUCOSE      POCT Blood Glucose.: 163 mg/dL (25 Sep 2020 12:17)  POCT Blood Glucose.: 165 mg/dL (25 Sep 2020 10:59)  POCT Blood Glucose.: 139 mg/dL (25 Sep 2020 05:24)  POCT Blood Glucose.: 140 mg/dL (24 Sep 2020 23:37)  POCT Blood Glucose.: 129 mg/dL (24 Sep 2020 21:09)  POCT Blood Glucose.: 108 mg/dL (24 Sep 2020 17:56)    COVID-19 PCR: NotDetec (23 Sep 2020 16:01)      Radiology: Reviewed.   < from: CT Abdomen and Pelvis w/ IV Cont (09.23.20 @ 13:26) >  EXAM:  CT ABDOMEN AND PELVIS IC                            PROCEDURE DATE:  09/23/2020          INTERPRETATION:  CT of the abdomen and pelvis with IV contrast    Clinical Indication: rectal bleeding    Technique: Axial multidetector CT images of the abdomen and pelvis are acquired following the administration of IV contrast (90 cc Omnipaque-350 administered, 10 cc discarded).    Comparison: None.    Findings: Limited sections through the lung bases demonstrate mild dependent pleural parenchymalchanges bilaterally.    The liver, gallbladder, common bile duct, pancreas, spleen, and the right adrenal appear unremarkable. Nonspecific small 1.4 cm left adrenal nodule.    There is a 1.9 cm indeterminate hypodense lesion with a Hounsfield unit of29 in the left kidney. There are other tiny hypodense lesions in the kidneys bilaterally, too small to characterize. No evidence for a ureteral calculus. No hydronephrosis.    The appendix appears normal. Apparent 4.2 cm lobulated masslike structure in the sigmoid colon (image 93 series 2). No bowel obstruction, or grossly thickened bowel wall.    Small fat-containing periumbilical hernia.    No evidence of free air, ascites, or enlarged lymph node.    The urinary bladder is within normal limits.The prostate appears grossly unremarkable.    Impression:    Apparent 4.2 cm lobulated masslike structure in the sigmoid colon. Colonoscopy is recommended to rule out a colon cancer.    Indeterminate 1.9 cm hypodense left renal lesion. If clinically indicated, renal ultrasound may be pursued for further evaluation.                        AMBROSE MEDINA M.D., ATTENDING RADIOLOGIST  This document has been electronically signed. Sep 23 2020  2:00PM    < end of copied text >      ORT Score -   Family Hx of substance abuse	Female	      Male  Alcohol 	                                           1                     3  Illegal drugs	                                   2                     3  Rx drugs                                           4 	                  4  Personal Hx of substance abuse		  Alcohol 	                                          3	                  3  Illegal drugs                                     4	                  4  Rx drugs                                            5 	                  5  Age between 16- 45 years	           1                     1  hx preadolescent sexual abuse	   3 	                  0  Psychological disease		  ADD, OCD, bipolar, schizophrenia   2	          2  Depression                                           1 	          1  Total: 0    a score of 3 or lower indicates low risk for opioid abuse		  a score of 4-7 indicates moderate risk for opioid abuse		  a score of 8 or higher indicates high risk for opioid abuse    REVIEW OF SYSTEMS:  CONSTITUTIONAL: No fever or fatigue  RESPIRATORY: No cough, wheezing, chills or hemoptysis; No shortness of breath  CARDIOVASCULAR: No chest pain, palpitations, dizziness, or leg swelling  GASTROINTESTINAL: + abdominal soreness. No nausea, vomiting; No diarrhea or constipation. + blood with BM at home   GENITOURINARY: No dysuria, frequency, hematuria, retention or incontinence  MUSCULOSKELETAL: No joint pain or swelling; No muscle, back, or extremity pain, no upper or lower motor strength weakness, no saddle anesthesia, bowel/bladder incontinence  NEURO: No numbness/ tingling in b/l LE   PSYCHIATRIC: No depression, anxiety, mood swings, or difficulty sleeping    PHYSICAL EXAM:  GENERAL:  Alert & Oriented X3, NAD, Good concentration  CHEST/LUNG: Clear to auscultation bilaterally; No rales, rhonchi, wheezing, or rubs  HEART: Regular rate and rhythm; No murmurs, rubs, or gallops  ABDOMEN: Soft, Appropriately tender to palpation, midline dressing c/d/i with EVE drainage in place. Nondistended; Bowel sounds present  EXTREMITIES:  2+ Peripheral Pulses, No cyanosis, or edema  MUSCULOSKELETAL: Motor Strength 5/5 B/L upper and lower extremities; moves all extremities equally against gravity; ROM intact; negative SLR  SKIN: No rashes or lesions    Risk factors associated with adverse outcomes related to opioid treatment  [ ]  Concurrent benzodiazepine use  [ ]  History/ Active substance use or alcohol use disorder  [ ] Psychiatric co-morbidity  [ ] Sleep apnea  [ ] COPD  [ ] BMI> 35  [ ] Liver dysfunction  [ ] Renal dysfunction  [ ] CHF  [X] Former Smoker  [ ]  Age > 60 years    [X ]  [place]  Reviewed and Copied to Chart. See below.    Plan of care and goal oriented pain management treatment options were discussed with patient and /or primary care giver; all questions and concerns were addressed and care was aligned with patient's wishes.    Educated patient on goal oriented pain management treatment options        Source of information: CHUY WALSH, Chart review  Patient language: English  : n/a    HPI:  Patient is a 65 year old male with PMHx of diabetes, extensive smoking history presenting with chief complaint of bright red blood per rectum. Patient first noticed blood when he was wiping after a bowel movement and noted red streaks. Later on in the day, patient noted red blood dripping from his rectum while urinating. Patient has no other complaints. Denied fever, chills cough, SOB, chest pain, abdominal pain, constipation, diarrhea, change in stool consistency, weight loss.    IN the ED, patient was hemodynamically stable, hemoglobin of 12.6. Patient noted to have a bowel movement in the ED that turned the toilet water red. CT abd was done that showed a mass in the sigmoid colon. GI, Dr. Montez was consulted and patient was started on bowel prep for colonoscopy.  (23 Sep 2020 23:55)      Patient is a 65y old  Male who presents with a chief complaint of bright red blood per rectum. Pt is s/p partial sigmoid laparoscopic colectomy 9/25, POD #0.  Pt seen and examined at bedside. Reports mild abdominal soreness, tolerable at this time. pain score, reports soreness is exacerbated by movement and palpation. Pt is NPO except medications. Denies lethargy, nausea, vomiting, constipation, itchiness. Reports having bright red blood per rectum with BM at home. Patient stated goal for pain control: to be able to take deep breaths, get out of bed to chair and ambulate with tolerable pain control. Pt denies taking medications for pain at home.     PAST MEDICAL & SURGICAL HISTORY:  Diabetes mellitus    Social History:  smoked 2packs a year for 20-25 years. Has quit smoking for many years. Occasional drinker. (23 Sep 2020 23:55)   [X ] Denies illicit drug use     Allergies    No Known Allergies    MEDICATIONS  (STANDING):  dextrose 5%. 1000 milliLiter(s) (50 mL/Hr) IV Continuous <Continuous>  dextrose 50% Injectable 12.5 Gram(s) IV Push once  dextrose 50% Injectable 25 Gram(s) IV Push once  dextrose 50% Injectable 25 Gram(s) IV Push once  insulin lispro (HumaLOG) corrective regimen sliding scale   SubCutaneous three times a day before meals  pantoprazole  Injectable 40 milliGRAM(s) IV Push daily  sodium chloride 0.9% with potassium chloride 20 mEq/L 1000 milliLiter(s) (125 mL/Hr) IV Continuous <Continuous>    MEDICATIONS  (PRN):  acetaminophen   Tablet .. 650 milliGRAM(s) Oral every 6 hours PRN Moderate Pain (4 - 6)  dextrose 40% Gel 15 Gram(s) Oral once PRN Blood Glucose LESS THAN 70 milliGRAM(s)/deciliter  glucagon  Injectable 1 milliGRAM(s) IntraMuscular once PRN Glucose LESS THAN 70 milligrams/deciliter  oxyCODONE    IR 5 milliGRAM(s) Oral every 4 hours PRN Severe Pain (7 - 10)      Vital Signs Last 24 Hrs  T(C): 36.4 (25 Sep 2020 12:21), Max: 36.8 (25 Sep 2020 08:30)  T(F): 97.6 (25 Sep 2020 12:21), Max: 98.2 (25 Sep 2020 08:30)  HR: 68 (25 Sep 2020 12:21) (68 - 91)  BP: 124/67 (25 Sep 2020 12:21) (92/80 - 142/75)  BP(mean): 92 (25 Sep 2020 11:46) (85 - 93)  RR: 18 (25 Sep 2020 12:21) (11 - 22)  SpO2: 99% (25 Sep 2020 12:21) (95% - 100%)    LABS: Reviewed.                          10.2   10.74 )-----------( 202      ( 25 Sep 2020 07:21 )             29.6     09-25    139  |  107  |  12  ----------------------------<  155<H>  4.1   |  28  |  0.94    Ca    8.4      25 Sep 2020 08:24  Phos  3.2     09-24  Mg     2.2     09-24      PT/INR - ( 25 Sep 2020 07:21 )   PT: 15.0 sec;   INR: 1.30 ratio         PTT - ( 25 Sep 2020 07:21 )  PTT:27.0 sec      CAPILLARY BLOOD GLUCOSE      POCT Blood Glucose.: 163 mg/dL (25 Sep 2020 12:17)  POCT Blood Glucose.: 165 mg/dL (25 Sep 2020 10:59)  POCT Blood Glucose.: 139 mg/dL (25 Sep 2020 05:24)  POCT Blood Glucose.: 140 mg/dL (24 Sep 2020 23:37)  POCT Blood Glucose.: 129 mg/dL (24 Sep 2020 21:09)  POCT Blood Glucose.: 108 mg/dL (24 Sep 2020 17:56)    COVID-19 PCR: NotDetec (23 Sep 2020 16:01)      Radiology: Reviewed.   < from: CT Abdomen and Pelvis w/ IV Cont (09.23.20 @ 13:26) >  EXAM:  CT ABDOMEN AND PELVIS IC                            PROCEDURE DATE:  09/23/2020          INTERPRETATION:  CT of the abdomen and pelvis with IV contrast    Clinical Indication: rectal bleeding    Technique: Axial multidetector CT images of the abdomen and pelvis are acquired following the administration of IV contrast (90 cc Omnipaque-350 administered, 10 cc discarded).    Comparison: None.    Findings: Limited sections through the lung bases demonstrate mild dependent pleural parenchymalchanges bilaterally.    The liver, gallbladder, common bile duct, pancreas, spleen, and the right adrenal appear unremarkable. Nonspecific small 1.4 cm left adrenal nodule.    There is a 1.9 cm indeterminate hypodense lesion with a Hounsfield unit of29 in the left kidney. There are other tiny hypodense lesions in the kidneys bilaterally, too small to characterize. No evidence for a ureteral calculus. No hydronephrosis.    The appendix appears normal. Apparent 4.2 cm lobulated masslike structure in the sigmoid colon (image 93 series 2). No bowel obstruction, or grossly thickened bowel wall.    Small fat-containing periumbilical hernia.    No evidence of free air, ascites, or enlarged lymph node.    The urinary bladder is within normal limits.The prostate appears grossly unremarkable.    Impression:    Apparent 4.2 cm lobulated masslike structure in the sigmoid colon. Colonoscopy is recommended to rule out a colon cancer.    Indeterminate 1.9 cm hypodense left renal lesion. If clinically indicated, renal ultrasound may be pursued for further evaluation.                        AMBROSE EMDINA M.D., ATTENDING RADIOLOGIST  This document has been electronically signed. Sep 23 2020  2:00PM    < end of copied text >      ORT Score -   Family Hx of substance abuse	Female	      Male  Alcohol 	                                           1                     3  Illegal drugs	                                   2                     3  Rx drugs                                           4 	                  4  Personal Hx of substance abuse		  Alcohol 	                                          3	                  3  Illegal drugs                                     4	                  4  Rx drugs                                            5 	                  5  Age between 16- 45 years	           1                     1  hx preadolescent sexual abuse	   3 	                  0  Psychological disease		  ADD, OCD, bipolar, schizophrenia   2	          2  Depression                                           1 	          1  Total: 0    a score of 3 or lower indicates low risk for opioid abuse		  a score of 4-7 indicates moderate risk for opioid abuse		  a score of 8 or higher indicates high risk for opioid abuse    REVIEW OF SYSTEMS:  CONSTITUTIONAL: No fever or fatigue  RESPIRATORY: No cough, wheezing, chills or hemoptysis; No shortness of breath  CARDIOVASCULAR: No chest pain, palpitations, dizziness, or leg swelling  GASTROINTESTINAL: + abdominal soreness. No nausea, vomiting; No diarrhea or constipation. + blood with BM at home   GENITOURINARY: No dysuria, frequency, hematuria, retention or incontinence  MUSCULOSKELETAL: No joint pain or swelling; No muscle, back, or extremity pain, no upper or lower motor strength weakness, no saddle anesthesia, bowel/bladder incontinence  NEURO: No numbness/ tingling in b/l LE   PSYCHIATRIC: No depression, anxiety, mood swings, or difficulty sleeping    PHYSICAL EXAM:  GENERAL:  Alert & Oriented X3, NAD, Good concentration  CHEST/LUNG: Clear to auscultation bilaterally; No rales, rhonchi, wheezing, or rubs  HEART: Regular rate and rhythm; No murmurs, rubs, or gallops  ABDOMEN: Soft, Appropriately tender to palpation, midline dressing c/d/i with EVE drainage in place. Nondistended; No bowel sounds present  EXTREMITIES:  2+ Peripheral Pulses, No cyanosis, or edema  MUSCULOSKELETAL: Motor Strength 5/5 B/L upper and lower extremities; moves all extremities equally against gravity; ROM intact; negative SLR  SKIN: No rashes or lesions    Risk factors associated with adverse outcomes related to opioid treatment  [ ]  Concurrent benzodiazepine use  [ ]  History/ Active substance use or alcohol use disorder  [ ] Psychiatric co-morbidity  [ ] Sleep apnea  [ ] COPD  [ ] BMI> 35  [ ] Liver dysfunction  [ ] Renal dysfunction  [ ] CHF  [X] Former Smoker  [ ]  Age > 60 years    [X ]  [place]  Reviewed and Copied to Chart. See below.    Plan of care and goal oriented pain management treatment options were discussed with patient and /or primary care giver; all questions and concerns were addressed and care was aligned with patient's wishes.    Educated patient on goal oriented pain management treatment options

## 2020-09-25 NOTE — CONSULT NOTE ADULT - PROBLEM SELECTOR RECOMMENDATION 9
Pt with acute abdominal pain which is somatic in nature due to s/p partial sigmoid laparoscopic colectomy 9/25, POD #0.  Opioid pain recommendations   - Oxycodone 5 mg PO q 4 hours PRN severe pain. Monitor for sedation/ respiratory depression.   Non-opioid pain recommendations   - Continue Acetaminophen 650mg PO q 6 hours PRN moderate pain.  Bowel Regimen  - Senna 2 tablets at bedtime PRN for constipation  Mild pain   - Non-pharmacological pain treatment recommendations  - Warm/ Cool packs PRN   - Repositioning, imagery, relaxation, distraction.  - Physical therapy OOB if no contraindications   Recommendations discussed with primary team and RN

## 2020-09-26 LAB
ANION GAP SERPL CALC-SCNC: 8 MMOL/L — SIGNIFICANT CHANGE UP (ref 5–17)
BUN SERPL-MCNC: 8 MG/DL — SIGNIFICANT CHANGE UP (ref 7–18)
CALCIUM SERPL-MCNC: 8 MG/DL — LOW (ref 8.4–10.5)
CHLORIDE SERPL-SCNC: 109 MMOL/L — HIGH (ref 96–108)
CO2 SERPL-SCNC: 24 MMOL/L — SIGNIFICANT CHANGE UP (ref 22–31)
CREAT SERPL-MCNC: 0.79 MG/DL — SIGNIFICANT CHANGE UP (ref 0.5–1.3)
GLUCOSE BLDC GLUCOMTR-MCNC: 119 MG/DL — HIGH (ref 70–99)
GLUCOSE BLDC GLUCOMTR-MCNC: 149 MG/DL — HIGH (ref 70–99)
GLUCOSE BLDC GLUCOMTR-MCNC: 165 MG/DL — HIGH (ref 70–99)
GLUCOSE BLDC GLUCOMTR-MCNC: 99 MG/DL — SIGNIFICANT CHANGE UP (ref 70–99)
GLUCOSE SERPL-MCNC: 89 MG/DL — SIGNIFICANT CHANGE UP (ref 70–99)
HCT VFR BLD CALC: 27.8 % — LOW (ref 39–50)
HGB BLD-MCNC: 9.6 G/DL — LOW (ref 13–17)
MCHC RBC-ENTMCNC: 31.5 PG — SIGNIFICANT CHANGE UP (ref 27–34)
MCHC RBC-ENTMCNC: 34.5 GM/DL — SIGNIFICANT CHANGE UP (ref 32–36)
MCV RBC AUTO: 91.1 FL — SIGNIFICANT CHANGE UP (ref 80–100)
NRBC # BLD: 0 /100 WBCS — SIGNIFICANT CHANGE UP (ref 0–0)
PLATELET # BLD AUTO: 216 K/UL — SIGNIFICANT CHANGE UP (ref 150–400)
POTASSIUM SERPL-MCNC: 3.9 MMOL/L — SIGNIFICANT CHANGE UP (ref 3.5–5.3)
POTASSIUM SERPL-SCNC: 3.9 MMOL/L — SIGNIFICANT CHANGE UP (ref 3.5–5.3)
RBC # BLD: 3.05 M/UL — LOW (ref 4.2–5.8)
RBC # FLD: 12.8 % — SIGNIFICANT CHANGE UP (ref 10.3–14.5)
SODIUM SERPL-SCNC: 141 MMOL/L — SIGNIFICANT CHANGE UP (ref 135–145)
WBC # BLD: 11.62 K/UL — HIGH (ref 3.8–10.5)
WBC # FLD AUTO: 11.62 K/UL — HIGH (ref 3.8–10.5)

## 2020-09-26 PROCEDURE — 99231 SBSQ HOSP IP/OBS SF/LOW 25: CPT

## 2020-09-26 RX ADMIN — HEPARIN SODIUM 5000 UNIT(S): 5000 INJECTION INTRAVENOUS; SUBCUTANEOUS at 21:25

## 2020-09-26 RX ADMIN — PANTOPRAZOLE SODIUM 40 MILLIGRAM(S): 20 TABLET, DELAYED RELEASE ORAL at 12:04

## 2020-09-26 RX ADMIN — HEPARIN SODIUM 5000 UNIT(S): 5000 INJECTION INTRAVENOUS; SUBCUTANEOUS at 13:15

## 2020-09-26 RX ADMIN — DEXTROSE MONOHYDRATE, SODIUM CHLORIDE, AND POTASSIUM CHLORIDE 125 MILLILITER(S): 50; .745; 4.5 INJECTION, SOLUTION INTRAVENOUS at 21:25

## 2020-09-26 RX ADMIN — DEXTROSE MONOHYDRATE, SODIUM CHLORIDE, AND POTASSIUM CHLORIDE 125 MILLILITER(S): 50; .745; 4.5 INJECTION, SOLUTION INTRAVENOUS at 10:32

## 2020-09-26 RX ADMIN — OXYCODONE HYDROCHLORIDE 5 MILLIGRAM(S): 5 TABLET ORAL at 16:17

## 2020-09-26 RX ADMIN — Medication 2: at 12:04

## 2020-09-26 RX ADMIN — OXYCODONE HYDROCHLORIDE 5 MILLIGRAM(S): 5 TABLET ORAL at 08:12

## 2020-09-26 RX ADMIN — OXYCODONE HYDROCHLORIDE 5 MILLIGRAM(S): 5 TABLET ORAL at 16:57

## 2020-09-26 RX ADMIN — HEPARIN SODIUM 5000 UNIT(S): 5000 INJECTION INTRAVENOUS; SUBCUTANEOUS at 05:41

## 2020-09-26 RX ADMIN — OXYCODONE HYDROCHLORIDE 5 MILLIGRAM(S): 5 TABLET ORAL at 08:43

## 2020-09-26 NOTE — PROGRESS NOTE ADULT - ASSESSMENT
Data Detail Level: Printer-Friendly View Extended View   Confidential Drug Utilization Report  Search Terms: Edouard Kelly, 1955   Search Date: 09/25/2020 12:41:27 PM   The Drug Utilization Report below displays all of the controlled substance prescriptions, if any, that your patient has filled in the last twelve months. The information displayed on this report is compiled from pharmacy submissions to the Department, and accurately reflects the information as submitted by the pharmacies.  This report was requested by: Keren Anderson | Reference #: [id number]   There are no results for the search terms that you entered.

## 2020-09-26 NOTE — PROGRESS NOTE ADULT - PROBLEM SELECTOR PLAN 1
Pt with acute abdominal pain which is somatic in nature due to s/p partial sigmoid laparoscopic colectomy 9/25, POD #1.  Opioid pain recommendations   - Continue Oxycodone 5 mg PO q 4 hours PRN severe pain. Monitor for sedation/ respiratory depression.   Non-opioid pain recommendations   - Continue Acetaminophen 650mg PO q 6 hours PRN moderate pain.  Bowel Regimen  - Defer to primary team.   Mild pain   - Non-pharmacological pain treatment recommendations  - Warm/ Cool packs PRN   - Repositioning, imagery, relaxation, distraction.  - Physical therapy OOB if no contraindications   Recommendations discussed with primary team and RN.

## 2020-09-26 NOTE — PROGRESS NOTE ADULT - ASSESSMENT
65 yoM s/p lap assisted sigmoid resection 9/25    afeb, vss, labs wnl  no bowel function    - advance to clears as tolerated  - pain control as needed  - oob, ambulate, PT  - dvt/gi ppx  - d/w covering attending

## 2020-09-27 LAB
ANION GAP SERPL CALC-SCNC: 5 MMOL/L — SIGNIFICANT CHANGE UP (ref 5–17)
BUN SERPL-MCNC: 5 MG/DL — LOW (ref 7–18)
CALCIUM SERPL-MCNC: 8.6 MG/DL — SIGNIFICANT CHANGE UP (ref 8.4–10.5)
CHLORIDE SERPL-SCNC: 107 MMOL/L — SIGNIFICANT CHANGE UP (ref 96–108)
CO2 SERPL-SCNC: 26 MMOL/L — SIGNIFICANT CHANGE UP (ref 22–31)
CREAT SERPL-MCNC: 0.72 MG/DL — SIGNIFICANT CHANGE UP (ref 0.5–1.3)
GLUCOSE BLDC GLUCOMTR-MCNC: 126 MG/DL — HIGH (ref 70–99)
GLUCOSE BLDC GLUCOMTR-MCNC: 144 MG/DL — HIGH (ref 70–99)
GLUCOSE BLDC GLUCOMTR-MCNC: 152 MG/DL — HIGH (ref 70–99)
GLUCOSE SERPL-MCNC: 126 MG/DL — HIGH (ref 70–99)
HCT VFR BLD CALC: 29 % — LOW (ref 39–50)
HGB BLD-MCNC: 9.4 G/DL — LOW (ref 13–17)
MAGNESIUM SERPL-MCNC: 2.2 MG/DL — SIGNIFICANT CHANGE UP (ref 1.6–2.6)
MCHC RBC-ENTMCNC: 30 PG — SIGNIFICANT CHANGE UP (ref 27–34)
MCHC RBC-ENTMCNC: 32.4 GM/DL — SIGNIFICANT CHANGE UP (ref 32–36)
MCV RBC AUTO: 92.7 FL — SIGNIFICANT CHANGE UP (ref 80–100)
NRBC # BLD: 0 /100 WBCS — SIGNIFICANT CHANGE UP (ref 0–0)
PHOSPHATE SERPL-MCNC: 1.7 MG/DL — LOW (ref 2.5–4.5)
PLATELET # BLD AUTO: 247 K/UL — SIGNIFICANT CHANGE UP (ref 150–400)
POTASSIUM SERPL-MCNC: 4 MMOL/L — SIGNIFICANT CHANGE UP (ref 3.5–5.3)
POTASSIUM SERPL-SCNC: 4 MMOL/L — SIGNIFICANT CHANGE UP (ref 3.5–5.3)
RBC # BLD: 3.13 M/UL — LOW (ref 4.2–5.8)
RBC # FLD: 13 % — SIGNIFICANT CHANGE UP (ref 10.3–14.5)
SODIUM SERPL-SCNC: 138 MMOL/L — SIGNIFICANT CHANGE UP (ref 135–145)
WBC # BLD: 8.06 K/UL — SIGNIFICANT CHANGE UP (ref 3.8–10.5)
WBC # FLD AUTO: 8.06 K/UL — SIGNIFICANT CHANGE UP (ref 3.8–10.5)

## 2020-09-27 RX ORDER — DIPHENHYDRAMINE HCL 50 MG
25 CAPSULE ORAL ONCE
Refills: 0 | Status: COMPLETED | OUTPATIENT
Start: 2020-09-27 | End: 2020-09-27

## 2020-09-27 RX ADMIN — OXYCODONE HYDROCHLORIDE 5 MILLIGRAM(S): 5 TABLET ORAL at 18:53

## 2020-09-27 RX ADMIN — Medication 2: at 07:45

## 2020-09-27 RX ADMIN — DEXTROSE MONOHYDRATE, SODIUM CHLORIDE, AND POTASSIUM CHLORIDE 125 MILLILITER(S): 50; .745; 4.5 INJECTION, SOLUTION INTRAVENOUS at 17:18

## 2020-09-27 RX ADMIN — HEPARIN SODIUM 5000 UNIT(S): 5000 INJECTION INTRAVENOUS; SUBCUTANEOUS at 14:30

## 2020-09-27 RX ADMIN — OXYCODONE HYDROCHLORIDE 5 MILLIGRAM(S): 5 TABLET ORAL at 08:10

## 2020-09-27 RX ADMIN — Medication 25 MILLIGRAM(S): at 21:06

## 2020-09-27 RX ADMIN — PANTOPRAZOLE SODIUM 40 MILLIGRAM(S): 20 TABLET, DELAYED RELEASE ORAL at 11:20

## 2020-09-27 RX ADMIN — OXYCODONE HYDROCHLORIDE 5 MILLIGRAM(S): 5 TABLET ORAL at 20:45

## 2020-09-27 RX ADMIN — OXYCODONE HYDROCHLORIDE 5 MILLIGRAM(S): 5 TABLET ORAL at 07:40

## 2020-09-27 RX ADMIN — HEPARIN SODIUM 5000 UNIT(S): 5000 INJECTION INTRAVENOUS; SUBCUTANEOUS at 21:06

## 2020-09-27 RX ADMIN — HEPARIN SODIUM 5000 UNIT(S): 5000 INJECTION INTRAVENOUS; SUBCUTANEOUS at 05:32

## 2020-09-27 RX ADMIN — DEXTROSE MONOHYDRATE, SODIUM CHLORIDE, AND POTASSIUM CHLORIDE 125 MILLILITER(S): 50; .745; 4.5 INJECTION, SOLUTION INTRAVENOUS at 06:42

## 2020-09-27 RX ADMIN — Medication 85 MILLIMOLE(S): at 17:18

## 2020-09-27 NOTE — DIETITIAN INITIAL EVALUATION ADULT. - OTHER INFO
Pt  Visited. Pt is on Diabetic Clear Liquid  Diet. Pt tolerating clear Liquid. diet. Pt Reports H/O DM x 5 years. ( On Metformin at Home). NKFA. Weight stable per  pt No significant weight Loss. Ht 5 feet 7 inches. UBW 92 KG.( 202 LB ) BMI 31.6. Pt S/P Lap assisted Sigmoid Resection for Sigmoid Mass.

## 2020-09-27 NOTE — DIETITIAN INITIAL EVALUATION ADULT. - PERTINENT LABORATORY DATA
09-27 Na138 mmol/L Glu 126 mg/dL<H> K+ 4.0 mmol/L Cr  0.72 mg/dL BUN 5 mg/dL<L>   09-27 Phos 1.7 mg/dL<L>   09-23 Alb 3.7 g/dL        09-24-20 @ 10:26 HgbA1C 7.4 [4.0 - 5.6]

## 2020-09-28 LAB
ANION GAP SERPL CALC-SCNC: 4 MMOL/L — LOW (ref 5–17)
BASOPHILS # BLD AUTO: 0.01 K/UL — SIGNIFICANT CHANGE UP (ref 0–0.2)
BASOPHILS NFR BLD AUTO: 0.1 % — SIGNIFICANT CHANGE UP (ref 0–2)
BUN SERPL-MCNC: 4 MG/DL — LOW (ref 7–18)
CALCIUM SERPL-MCNC: 8.5 MG/DL — SIGNIFICANT CHANGE UP (ref 8.4–10.5)
CHLORIDE SERPL-SCNC: 106 MMOL/L — SIGNIFICANT CHANGE UP (ref 96–108)
CO2 SERPL-SCNC: 28 MMOL/L — SIGNIFICANT CHANGE UP (ref 22–31)
CREAT SERPL-MCNC: 0.73 MG/DL — SIGNIFICANT CHANGE UP (ref 0.5–1.3)
EOSINOPHIL # BLD AUTO: 0.23 K/UL — SIGNIFICANT CHANGE UP (ref 0–0.5)
EOSINOPHIL NFR BLD AUTO: 3.2 % — SIGNIFICANT CHANGE UP (ref 0–6)
GLUCOSE BLDC GLUCOMTR-MCNC: 131 MG/DL — HIGH (ref 70–99)
GLUCOSE BLDC GLUCOMTR-MCNC: 132 MG/DL — HIGH (ref 70–99)
GLUCOSE BLDC GLUCOMTR-MCNC: 139 MG/DL — HIGH (ref 70–99)
GLUCOSE BLDC GLUCOMTR-MCNC: 142 MG/DL — HIGH (ref 70–99)
GLUCOSE BLDC GLUCOMTR-MCNC: 168 MG/DL — HIGH (ref 70–99)
GLUCOSE SERPL-MCNC: 119 MG/DL — HIGH (ref 70–99)
HCT VFR BLD CALC: 27.7 % — LOW (ref 39–50)
HGB BLD-MCNC: 9 G/DL — LOW (ref 13–17)
IMM GRANULOCYTES NFR BLD AUTO: 0.3 % — SIGNIFICANT CHANGE UP (ref 0–1.5)
LYMPHOCYTES # BLD AUTO: 1.55 K/UL — SIGNIFICANT CHANGE UP (ref 1–3.3)
LYMPHOCYTES # BLD AUTO: 21.9 % — SIGNIFICANT CHANGE UP (ref 13–44)
MAGNESIUM SERPL-MCNC: 2.2 MG/DL — SIGNIFICANT CHANGE UP (ref 1.6–2.6)
MCHC RBC-ENTMCNC: 30.1 PG — SIGNIFICANT CHANGE UP (ref 27–34)
MCHC RBC-ENTMCNC: 32.5 GM/DL — SIGNIFICANT CHANGE UP (ref 32–36)
MCV RBC AUTO: 92.6 FL — SIGNIFICANT CHANGE UP (ref 80–100)
MONOCYTES # BLD AUTO: 0.84 K/UL — SIGNIFICANT CHANGE UP (ref 0–0.9)
MONOCYTES NFR BLD AUTO: 11.9 % — SIGNIFICANT CHANGE UP (ref 2–14)
NEUTROPHILS # BLD AUTO: 4.43 K/UL — SIGNIFICANT CHANGE UP (ref 1.8–7.4)
NEUTROPHILS NFR BLD AUTO: 62.6 % — SIGNIFICANT CHANGE UP (ref 43–77)
NRBC # BLD: 0 /100 WBCS — SIGNIFICANT CHANGE UP (ref 0–0)
PHOSPHATE SERPL-MCNC: 2.9 MG/DL — SIGNIFICANT CHANGE UP (ref 2.5–4.5)
PLATELET # BLD AUTO: 275 K/UL — SIGNIFICANT CHANGE UP (ref 150–400)
POTASSIUM SERPL-MCNC: 3.8 MMOL/L — SIGNIFICANT CHANGE UP (ref 3.5–5.3)
POTASSIUM SERPL-SCNC: 3.8 MMOL/L — SIGNIFICANT CHANGE UP (ref 3.5–5.3)
RBC # BLD: 2.99 M/UL — LOW (ref 4.2–5.8)
RBC # FLD: 13.1 % — SIGNIFICANT CHANGE UP (ref 10.3–14.5)
SODIUM SERPL-SCNC: 138 MMOL/L — SIGNIFICANT CHANGE UP (ref 135–145)
WBC # BLD: 7.08 K/UL — SIGNIFICANT CHANGE UP (ref 3.8–10.5)
WBC # FLD AUTO: 7.08 K/UL — SIGNIFICANT CHANGE UP (ref 3.8–10.5)

## 2020-09-28 PROCEDURE — 99231 SBSQ HOSP IP/OBS SF/LOW 25: CPT

## 2020-09-28 RX ORDER — DIPHENHYDRAMINE HCL 50 MG
25 CAPSULE ORAL EVERY 6 HOURS
Refills: 0 | Status: DISCONTINUED | OUTPATIENT
Start: 2020-09-28 | End: 2020-09-30

## 2020-09-28 RX ORDER — TRAMADOL HYDROCHLORIDE 50 MG/1
25 TABLET ORAL EVERY 6 HOURS
Refills: 0 | Status: DISCONTINUED | OUTPATIENT
Start: 2020-09-28 | End: 2020-09-30

## 2020-09-28 RX ADMIN — HEPARIN SODIUM 5000 UNIT(S): 5000 INJECTION INTRAVENOUS; SUBCUTANEOUS at 21:16

## 2020-09-28 RX ADMIN — PANTOPRAZOLE SODIUM 40 MILLIGRAM(S): 20 TABLET, DELAYED RELEASE ORAL at 12:02

## 2020-09-28 RX ADMIN — Medication 25 MILLIGRAM(S): at 06:55

## 2020-09-28 RX ADMIN — HEPARIN SODIUM 5000 UNIT(S): 5000 INJECTION INTRAVENOUS; SUBCUTANEOUS at 06:10

## 2020-09-28 RX ADMIN — DEXTROSE MONOHYDRATE, SODIUM CHLORIDE, AND POTASSIUM CHLORIDE 125 MILLILITER(S): 50; .745; 4.5 INJECTION, SOLUTION INTRAVENOUS at 12:02

## 2020-09-28 RX ADMIN — OXYCODONE HYDROCHLORIDE 5 MILLIGRAM(S): 5 TABLET ORAL at 06:55

## 2020-09-28 RX ADMIN — Medication 25 MILLIGRAM(S): at 13:02

## 2020-09-28 RX ADMIN — Medication 650 MILLIGRAM(S): at 13:02

## 2020-09-28 RX ADMIN — Medication 650 MILLIGRAM(S): at 13:35

## 2020-09-28 RX ADMIN — OXYCODONE HYDROCHLORIDE 5 MILLIGRAM(S): 5 TABLET ORAL at 06:13

## 2020-09-28 RX ADMIN — HEPARIN SODIUM 5000 UNIT(S): 5000 INJECTION INTRAVENOUS; SUBCUTANEOUS at 13:03

## 2020-09-28 NOTE — PROGRESS NOTE ADULT - ASSESSMENT
Data Detail Level: Printer-Friendly View Extended View   Confidential Drug Utilization Report  Search Terms: Edouard Kelly, 1955   Search Date: 09/25/2020 12:41:27 PM   The Drug Utilization Report below displays all of the controlled substance prescriptions, if any, that your patient has filled in the last twelve months. The information displayed on this report is compiled from pharmacy submissions to the Department, and accurately reflects the information as submitted by the pharmacies.  This report was requested by: Keren Anderson | Reference #: [id number]   There are no results for the search terms that you entered.

## 2020-09-28 NOTE — PROGRESS NOTE ADULT - PROBLEM SELECTOR PLAN 1
Pt with acute abdominal pain which is somatic in nature due to s/p partial sigmoid laparoscopic colectomy 9/25, POD #3.  Opioid pain recommendations   - Discontinue Oxycodone 5 mg PO q 4 hours PRN severe pain. Pt noted to have itchiness and rash.   - Tramadol 50mg PO q6h PRN severe pain. Monitor for sedation/ respiratory depression.   Non-opioid pain recommendations   - Continue Acetaminophen 650mg PO q 6 hours PRN moderate pain.  Bowel Regimen  - Defer to primary team.   Mild pain   - Non-pharmacological pain treatment recommendations  - Warm/ Cool packs PRN   - Repositioning, imagery, relaxation, distraction.  - Physical therapy OOB if no contraindications   Recommendations discussed with primary team and RN. Pt with acute abdominal pain which is somatic in nature due to s/p partial sigmoid laparoscopic colectomy 9/25, POD #3.  Opioid pain recommendations   - Discontinue Oxycodone 5 mg PO q 4 hours PRN severe pain. Pt noted to have itchiness and rash.   - Tramadol 25mg PO q6h PRN severe pain. Monitor for sedation/ respiratory depression.   Non-opioid pain recommendations   - Continue Acetaminophen 650mg PO q 6 hours PRN moderate pain.  Bowel Regimen  - Defer to primary team.   Mild pain   - Non-pharmacological pain treatment recommendations  - Warm/ Cool packs PRN   - Repositioning, imagery, relaxation, distraction.  - Physical therapy OOB if no contraindications   Recommendations discussed with primary team and RN.

## 2020-09-28 NOTE — PROGRESS NOTE ADULT - ASSESSMENT
65M s/p lap assisted sigmoid resection 9/25, stable  hypophos resolved s/p repletion    - d/c planning per primary team once pt has return of bowel fx  - pain control as needed  - oob, ambulate, PT  - dvt/gi ppx

## 2020-09-29 LAB
GLUCOSE BLDC GLUCOMTR-MCNC: 125 MG/DL — HIGH (ref 70–99)
GLUCOSE BLDC GLUCOMTR-MCNC: 152 MG/DL — HIGH (ref 70–99)
GLUCOSE BLDC GLUCOMTR-MCNC: 189 MG/DL — HIGH (ref 70–99)
GLUCOSE BLDC GLUCOMTR-MCNC: 220 MG/DL — HIGH (ref 70–99)

## 2020-09-29 PROCEDURE — 99231 SBSQ HOSP IP/OBS SF/LOW 25: CPT

## 2020-09-29 RX ADMIN — PANTOPRAZOLE SODIUM 40 MILLIGRAM(S): 20 TABLET, DELAYED RELEASE ORAL at 11:14

## 2020-09-29 RX ADMIN — Medication 650 MILLIGRAM(S): at 08:45

## 2020-09-29 RX ADMIN — Medication 25 MILLIGRAM(S): at 06:24

## 2020-09-29 RX ADMIN — HEPARIN SODIUM 5000 UNIT(S): 5000 INJECTION INTRAVENOUS; SUBCUTANEOUS at 13:55

## 2020-09-29 RX ADMIN — Medication 2: at 16:56

## 2020-09-29 RX ADMIN — Medication 650 MILLIGRAM(S): at 17:30

## 2020-09-29 RX ADMIN — Medication 4: at 11:51

## 2020-09-29 RX ADMIN — HEPARIN SODIUM 5000 UNIT(S): 5000 INJECTION INTRAVENOUS; SUBCUTANEOUS at 21:10

## 2020-09-29 RX ADMIN — Medication 25 MILLIGRAM(S): at 16:55

## 2020-09-29 RX ADMIN — HEPARIN SODIUM 5000 UNIT(S): 5000 INJECTION INTRAVENOUS; SUBCUTANEOUS at 05:18

## 2020-09-29 RX ADMIN — Medication 650 MILLIGRAM(S): at 17:02

## 2020-09-29 RX ADMIN — Medication 650 MILLIGRAM(S): at 09:15

## 2020-09-29 NOTE — PROGRESS NOTE ADULT - ASSESSMENT
Data Detail Level: Printer-Friendly View Extended View   Confidential Drug Utilization Report  Search Terms: Edouard Kelly, 1955   Search Date: 09/25/2020 12:41:27 PM   The Drug Utilization Report below displays all of the controlled substance prescriptions, if any, that your patient has filled in the last twelve months. The information displayed on this report is compiled from pharmacy submissions to the Department, and accurately reflects the information as submitted by the pharmacies.  This report was requested by: Keren Anderson | Reference #: [id number]   There are no results for the search terms that you entered.

## 2020-09-29 NOTE — PROGRESS NOTE ADULT - PROBLEM SELECTOR PROBLEM 3
BRBPR (bright red blood per rectum)
Anemia

## 2020-09-29 NOTE — PROGRESS NOTE ADULT - PROBLEM SELECTOR PLAN 1
Pt with acute abdominal pain which is somatic in nature due to s/p partial sigmoid laparoscopic colectomy 9/25, POD #4.  Opioid pain recommendations   - Continue Tramadol 25mg PO q6h PRN severe pain. Monitor for sedation/ respiratory depression.   Non-opioid pain recommendations   - Continue Acetaminophen 650mg PO q 6 hours PRN moderate pain.  Bowel Regimen  - Defer to primary team.   Mild pain   - Non-pharmacological pain treatment recommendations  - Warm/ Cool packs PRN   - Repositioning, imagery, relaxation, distraction.  - Physical therapy OOB if no contraindications   Recommendations discussed with primary team and RN.

## 2020-09-30 ENCOUNTER — TRANSCRIPTION ENCOUNTER (OUTPATIENT)
Age: 65
End: 2020-09-30

## 2020-09-30 VITALS
TEMPERATURE: 99 F | DIASTOLIC BLOOD PRESSURE: 57 MMHG | HEART RATE: 87 BPM | RESPIRATION RATE: 18 BRPM | SYSTOLIC BLOOD PRESSURE: 108 MMHG | OXYGEN SATURATION: 97 %

## 2020-09-30 LAB
GLUCOSE BLDC GLUCOMTR-MCNC: 141 MG/DL — HIGH (ref 70–99)
GLUCOSE BLDC GLUCOMTR-MCNC: 159 MG/DL — HIGH (ref 70–99)
SURGICAL PATHOLOGY STUDY: SIGNIFICANT CHANGE UP

## 2020-09-30 PROCEDURE — 82378 CARCINOEMBRYONIC ANTIGEN: CPT

## 2020-09-30 PROCEDURE — 88305 TISSUE EXAM BY PATHOLOGIST: CPT

## 2020-09-30 PROCEDURE — 86769 SARS-COV-2 COVID-19 ANTIBODY: CPT

## 2020-09-30 PROCEDURE — 85730 THROMBOPLASTIN TIME PARTIAL: CPT

## 2020-09-30 PROCEDURE — 36415 COLL VENOUS BLD VENIPUNCTURE: CPT

## 2020-09-30 PROCEDURE — 86900 BLOOD TYPING SEROLOGIC ABO: CPT

## 2020-09-30 PROCEDURE — 83550 IRON BINDING TEST: CPT

## 2020-09-30 PROCEDURE — 88309 TISSUE EXAM BY PATHOLOGIST: CPT

## 2020-09-30 PROCEDURE — 82962 GLUCOSE BLOOD TEST: CPT

## 2020-09-30 PROCEDURE — 86923 COMPATIBILITY TEST ELECTRIC: CPT

## 2020-09-30 PROCEDURE — 86901 BLOOD TYPING SEROLOGIC RH(D): CPT

## 2020-09-30 PROCEDURE — 88304 TISSUE EXAM BY PATHOLOGIST: CPT

## 2020-09-30 PROCEDURE — 85025 COMPLETE CBC W/AUTO DIFF WBC: CPT

## 2020-09-30 PROCEDURE — 87635 SARS-COV-2 COVID-19 AMP PRB: CPT

## 2020-09-30 PROCEDURE — 85027 COMPLETE CBC AUTOMATED: CPT

## 2020-09-30 PROCEDURE — 99285 EMERGENCY DEPT VISIT HI MDM: CPT | Mod: 25

## 2020-09-30 PROCEDURE — 85610 PROTHROMBIN TIME: CPT

## 2020-09-30 PROCEDURE — 80053 COMPREHEN METABOLIC PANEL: CPT

## 2020-09-30 PROCEDURE — 83540 ASSAY OF IRON: CPT

## 2020-09-30 PROCEDURE — 84100 ASSAY OF PHOSPHORUS: CPT

## 2020-09-30 PROCEDURE — 86850 RBC ANTIBODY SCREEN: CPT

## 2020-09-30 PROCEDURE — 86803 HEPATITIS C AB TEST: CPT

## 2020-09-30 PROCEDURE — C1889: CPT

## 2020-09-30 PROCEDURE — 82728 ASSAY OF FERRITIN: CPT

## 2020-09-30 PROCEDURE — 83735 ASSAY OF MAGNESIUM: CPT

## 2020-09-30 PROCEDURE — 80048 BASIC METABOLIC PNL TOTAL CA: CPT

## 2020-09-30 PROCEDURE — 74177 CT ABD & PELVIS W/CONTRAST: CPT

## 2020-09-30 PROCEDURE — 83036 HEMOGLOBIN GLYCOSYLATED A1C: CPT

## 2020-09-30 RX ORDER — METFORMIN HYDROCHLORIDE 850 MG/1
1 TABLET ORAL
Qty: 0 | Refills: 0 | DISCHARGE

## 2020-09-30 RX ORDER — TRAMADOL HYDROCHLORIDE 50 MG/1
0.5 TABLET ORAL
Qty: 6 | Refills: 0
Start: 2020-09-30 | End: 2020-10-02

## 2020-09-30 RX ADMIN — Medication 650 MILLIGRAM(S): at 08:32

## 2020-09-30 RX ADMIN — Medication 25 MILLIGRAM(S): at 08:32

## 2020-09-30 RX ADMIN — HEPARIN SODIUM 5000 UNIT(S): 5000 INJECTION INTRAVENOUS; SUBCUTANEOUS at 05:31

## 2020-09-30 NOTE — DISCHARGE NOTE PROVIDER - CARE PROVIDER_API CALL
Bobby Ellis  SURGERY  [address] [place] [address]  Phone: [phone]  Fax: [phone]  Follow Up Time: 1 week    Zaheer Hernandez  INTERNAL MEDICINE  [address] [facility] [address], [address]  [place] [address]  Phone: [phone]  Fax: [phone]  Follow Up Time: 1-3 days

## 2020-09-30 NOTE — DISCHARGE NOTE PROVIDER - NSDCMRMEDTOKEN_GEN_ALL_CORE_FT
metFORMIN 1000 mg oral tablet: 1 tab(s) orally 2 times a day   metFORMIN 1000 mg oral tablet: 1 tab(s) orally 2 times a day  traMADol 50 mg oral tablet: 0.5 tab(s) orally every 6 hours, As needed, Severe Pain (7 - 10) MDD:200mg

## 2020-09-30 NOTE — DISCHARGE NOTE PROVIDER - NSDCCPTREATMENT_GEN_ALL_CORE_FT
PRINCIPAL PROCEDURE  Procedure: Colectomy, sigmoid, partial, laparoscopic  Findings and Treatment:

## 2020-09-30 NOTE — PROGRESS NOTE ADULT - ASSESSMENT
65M s/p lap assisted sigmoid resection 9/25, doing well    - change EVE dressing  - d/c  home today  - pt advised can shower with EVE dressing

## 2020-09-30 NOTE — DISCHARGE NOTE PROVIDER - NSDCCPCAREPLAN_GEN_ALL_CORE_FT
PRINCIPAL DISCHARGE DIAGNOSIS  Diagnosis: BRBPR (bright red blood per rectum)  Assessment and Plan of Treatment:       SECONDARY DISCHARGE DIAGNOSES  Diagnosis: Colonic mass  Assessment and Plan of Treatment: Colonic mass

## 2020-09-30 NOTE — PROGRESS NOTE ADULT - PROVIDER SPECIALTY LIST ADULT
Gastroenterology
Internal Medicine
Pain Medicine
Surgery
Internal Medicine
Surgery

## 2020-09-30 NOTE — DISCHARGE NOTE NURSING/CASE MANAGEMENT/SOCIAL WORK - PATIENT PORTAL LINK FT
You can access the FollowMyHealth Patient Portal offered by [facility] by registering at the following website: [email or web address]. By joining [employer]’s FollowMyHealth portal, you will also be able to view your health information using other applications (apps) compatible with our system.

## 2020-09-30 NOTE — PROGRESS NOTE ADULT - SUBJECTIVE AND OBJECTIVE BOX
INTERVAL HPI/OVERNIGHT EVENTS:  No acute events overnight. Admits to flatus. Denies BM. Ambulating. Tolerating reg diet.     MEDICATIONS  (STANDING):  dextrose 5%. 1000 milliLiter(s) (50 mL/Hr) IV Continuous <Continuous>  dextrose 50% Injectable 12.5 Gram(s) IV Push once  dextrose 50% Injectable 25 Gram(s) IV Push once  dextrose 50% Injectable 25 Gram(s) IV Push once  heparin   Injectable 5000 Unit(s) SubCutaneous every 8 hours  insulin lispro (HumaLOG) corrective regimen sliding scale   SubCutaneous three times a day before meals  pantoprazole  Injectable 40 milliGRAM(s) IV Push daily  sodium chloride 0.9% with potassium chloride 20 mEq/L 1000 milliLiter(s) (125 mL/Hr) IV Continuous <Continuous>    MEDICATIONS  (PRN):  acetaminophen   Tablet .. 650 milliGRAM(s) Oral every 6 hours PRN Moderate Pain (4 - 6)  dextrose 40% Gel 15 Gram(s) Oral once PRN Blood Glucose LESS THAN 70 milliGRAM(s)/deciliter  diphenhydrAMINE 25 milliGRAM(s) Oral every 6 hours PRN Rash and/or Itching  glucagon  Injectable 1 milliGRAM(s) IntraMuscular once PRN Glucose LESS THAN 70 milligrams/deciliter  oxyCODONE    IR 5 milliGRAM(s) Oral every 4 hours PRN Severe Pain (7 - 10)      ICU Vital Signs Last 24 Hrs  T(C): 37.3 (29 Sep 2020 05:45), Max: 37.7 (28 Sep 2020 14:10)  T(F): 99.1 (29 Sep 2020 05:45), Max: 99.8 (28 Sep 2020 14:10)  HR: 73 (29 Sep 2020 05:45) (73 - 82)  BP: 118/65 (29 Sep 2020 05:45) (116/68 - 124/72)  BP(mean): --  ABP: --  ABP(mean): --  RR: 17 (29 Sep 2020 05:45) (17 - 18)  SpO2: 97% (29 Sep 2020 05:45) (96% - 98%)    Physical:  General: awake, alert, NAD, appears comfortable  Respiratory: nonlabored breathing  Abdomen: Soft nondistended, nontender, EVE in place with good seal        
INTERVAL HPI/OVERNIGHT EVENTS:  Patient seen at bedside,no acute issues  VITAL SIGNS:  T(F): 98.8 (09-27-20 @ 05:22)  HR: 81 (09-27-20 @ 05:22)  BP: 128/84 (09-27-20 @ 05:22)  RR: 18 (09-27-20 @ 05:22)  SpO2: 95% (09-27-20 @ 05:22)  Wt(kg): --    PHYSICAL EXAM:  awake,alert  Constitutional:  Eyes:  ENMT:perrla  Neck:  Respiratory:clear  Cardiovascular:s1 s2,m-none  Gastrointestinal:soft, bs pos  Extremities:  Vascular:  Neurological:no focal deficit  Musculoskeletal:    MEDICATIONS  (STANDING):  dextrose 5%. 1000 milliLiter(s) (50 mL/Hr) IV Continuous <Continuous>  dextrose 50% Injectable 12.5 Gram(s) IV Push once  dextrose 50% Injectable 25 Gram(s) IV Push once  dextrose 50% Injectable 25 Gram(s) IV Push once  heparin   Injectable 5000 Unit(s) SubCutaneous every 8 hours  insulin lispro (HumaLOG) corrective regimen sliding scale   SubCutaneous three times a day before meals  pantoprazole  Injectable 40 milliGRAM(s) IV Push daily  sodium chloride 0.9% with potassium chloride 20 mEq/L 1000 milliLiter(s) (125 mL/Hr) IV Continuous <Continuous>    MEDICATIONS  (PRN):  acetaminophen   Tablet .. 650 milliGRAM(s) Oral every 6 hours PRN Moderate Pain (4 - 6)  dextrose 40% Gel 15 Gram(s) Oral once PRN Blood Glucose LESS THAN 70 milliGRAM(s)/deciliter  glucagon  Injectable 1 milliGRAM(s) IntraMuscular once PRN Glucose LESS THAN 70 milligrams/deciliter  oxyCODONE    IR 5 milliGRAM(s) Oral every 4 hours PRN Severe Pain (7 - 10)      Allergies    No Known Allergies    Intolerances        LABS:                        9.4    8.06  )-----------( 247      ( 27 Sep 2020 07:05 )             29.0     09-27    138  |  107  |  5<L>  ----------------------------<  126<H>  4.0   |  26  |  0.72    Ca    8.6      27 Sep 2020 07:05  Phos  1.7     09-27  Mg     2.2     09-27      Assessment and Plan:    Assessment:  · Assessment	  Patient is a 65 year old male with PMHx of diabetes presenting with bright red blood per rectum and CT findings of 4.2cm mass in the sigmoid colon concerning for malignancy. Admitted for further workup including colonoscopy.      Problem/Plan - 1:  ·  Problem: Colonic mass-s/p resection  GI, Dr. Montez was consulted  -f/up surgery team  -pain meds  -encourage ambulation  -advance diet  -d/c planning once cleared by surgery     Problem/Plan - 2:  ·  Problem: Diabetes mellitus.  Plan: Hx of DM on Metformin 1000mg BID.  - Hold oral medications, started on insulin sliding scale.  Follow up hgb a1c.      Problem/Plan - 3:  ·  Problem: Anemia.  Plan: With hgb of 12.6  Likely iron def from bleed vs AOCD from underlying malignancy  Follow up iron panel.      Problem/Plan - 4:  ·  Problem: Need for prophylactic measure.  Plan: IMPROVE VTE score: 1  Will manage with: Hold anticoagulation in the setting of ongoing GI Bleed.    [ ] Previous VTE                                    3  [ ] Thrombophilia                                  2  [] Lower limb paralysis                        2  (unable to hold up >15 seconds)    [ ] Current Cancer (within 6 months)        2   [] Immobilization > 24 hrs                    1  [ ] ICU/CCU stay > 24 hrs                      1  [x] Age > 60                                         1.       
  INTERVAL HPI/OVERNIGHT EVENTS:  No acute events overnight. Admits to flatus and BM this AM.  Ambulating. Tolerating reg diet.     MEDICATIONS  (STANDING):  dextrose 5%. 1000 milliLiter(s) (50 mL/Hr) IV Continuous <Continuous>  dextrose 50% Injectable 12.5 Gram(s) IV Push once  dextrose 50% Injectable 25 Gram(s) IV Push once  dextrose 50% Injectable 25 Gram(s) IV Push once  heparin   Injectable 5000 Unit(s) SubCutaneous every 8 hours  insulin lispro (HumaLOG) corrective regimen sliding scale   SubCutaneous three times a day before meals  pantoprazole  Injectable 40 milliGRAM(s) IV Push daily  sodium chloride 0.9% with potassium chloride 20 mEq/L 1000 milliLiter(s) (125 mL/Hr) IV Continuous <Continuous>    MEDICATIONS  (PRN):  acetaminophen   Tablet .. 650 milliGRAM(s) Oral every 6 hours PRN Moderate Pain (4 - 6)  dextrose 40% Gel 15 Gram(s) Oral once PRN Blood Glucose LESS THAN 70 milliGRAM(s)/deciliter  diphenhydrAMINE 25 milliGRAM(s) Oral every 6 hours PRN Rash and/or Itching  glucagon  Injectable 1 milliGRAM(s) IntraMuscular once PRN Glucose LESS THAN 70 milligrams/deciliter  oxyCODONE    IR 5 milliGRAM(s) Oral every 4 hours PRN Severe Pain (7 - 10)    ICU Vital Signs Last 24 Hrs  T(C): 36.6 (30 Sep 2020 05:23), Max: 36.8 (29 Sep 2020 21:24)  T(F): 97.8 (30 Sep 2020 05:23), Max: 98.2 (29 Sep 2020 21:24)  HR: 68 (30 Sep 2020 05:23) (68 - 85)  BP: 119/69 (30 Sep 2020 05:23) (119/69 - 121/74)  BP(mean): --  ABP: --  ABP(mean): --  RR: 17 (30 Sep 2020 05:23) (17 - 18)  SpO2: 97% (30 Sep 2020 05:23) (96% - 97%)      Physical:  General: awake, alert, NAD, appears comfortable  Respiratory: nonlabored breathing  Abdomen: Soft nondistended, nontender, EVE in place with good seal        
  Source of information: CHUY WALSH, Chart review  Patient language: Australian  : [id number]    HPI:  Patient is a 65 year old male with PMHx of diabetes, extensive smoking history presenting with chief complaint of bright red blood per rectum. Patient first noticed blood when he was wiping after a bowel movement and noted red streaks. Later on in the day, patient noted red blood dripping from his rectum while urinating. Patient has no other complaints. Denied fever, chills cough, SOB, chest pain, abdominal pain, constipation, diarrhea, change in stool consistency, weight loss.    IN the ED, patient was hemodynamically stable, hemoglobin of 12.6. Patient noted to have a bowel movement in the ED that turned the toilet water red. CT abd was done that showed a mass in the sigmoid colon. GI, Dr. Montez was consulted and patient was started on bowel prep for colonoscopy.  (23 Sep 2020 23:55)      Patient is a 65y old  Male who presents with a chief complaint of bright red blood per rectum. Pt is s/p partial sigmoid laparoscopic colectomy 9/25, POD #1.  Pt seen and examined at bedside. Reports mild abdominal soreness, tolerable at this time, reports pain score 5/10. Reports soreness is exacerbated by movement and palpation. Pt continues to be NPO except medications. Denies lethargy, nausea, vomiting, constipation, itchiness. Reports having bright red blood per rectum with BM at home. Pt has not yet passed flatus since surgery. Patient stated goal for pain control: to be able to take deep breaths, get out of bed to chair and ambulate with tolerable pain control. Pt denies taking medications for pain at home. Reports he would like to participate with PT and ambulate today.     PAST MEDICAL & SURGICAL HISTORY:  Diabetes mellitus    Social History:  smoked 2packs a year for 20-25 years. Has quit smoking for many years. Occasional drinker. (23 Sep 2020 23:55)   [X ] Denies illicit drug use     Allergies    No Known Allergies    MEDICATIONS  (STANDING):  dextrose 5%. 1000 milliLiter(s) (50 mL/Hr) IV Continuous <Continuous>  dextrose 50% Injectable 12.5 Gram(s) IV Push once  dextrose 50% Injectable 25 Gram(s) IV Push once  dextrose 50% Injectable 25 Gram(s) IV Push once  heparin   Injectable 5000 Unit(s) SubCutaneous every 8 hours  insulin lispro (HumaLOG) corrective regimen sliding scale   SubCutaneous three times a day before meals  pantoprazole  Injectable 40 milliGRAM(s) IV Push daily  sodium chloride 0.9% with potassium chloride 20 mEq/L 1000 milliLiter(s) (125 mL/Hr) IV Continuous <Continuous>    MEDICATIONS  (PRN):  acetaminophen   Tablet .. 650 milliGRAM(s) Oral every 6 hours PRN Moderate Pain (4 - 6)  dextrose 40% Gel 15 Gram(s) Oral once PRN Blood Glucose LESS THAN 70 milliGRAM(s)/deciliter  glucagon  Injectable 1 milliGRAM(s) IntraMuscular once PRN Glucose LESS THAN 70 milligrams/deciliter  oxyCODONE    IR 5 milliGRAM(s) Oral every 4 hours PRN Severe Pain (7 - 10)      Vital Signs Last 24 Hrs  T(C): 36.7 (26 Sep 2020 05:56), Max: 37.4 (26 Sep 2020 00:05)  T(F): 98.1 (26 Sep 2020 05:56), Max: 99.4 (26 Sep 2020 00:05)  HR: 87 (26 Sep 2020 05:56) (68 - 985)  BP: 121/68 (26 Sep 2020 05:56) (92/80 - 142/75)  BP(mean): 92 (25 Sep 2020 11:46) (85 - 93)  RR: 17 (26 Sep 2020 05:56) (11 - 22)  SpO2: 97% (26 Sep 2020 05:56) (95% - 100%)  COVID-19 PCR: NotDetec (23 Sep 2020 16:01)    LABS: Reviewed                          9.6    11.62 )-----------( 216      ( 26 Sep 2020 07:33 )             27.8     09-26    141  |  109<H>  |  8   ----------------------------<  89  3.9   |  24  |  0.79    Ca    8.0<L>      26 Sep 2020 07:33      PT/INR - ( 25 Sep 2020 07:21 )   PT: 15.0 sec;   INR: 1.30 ratio         PTT - ( 25 Sep 2020 07:21 )  PTT:27.0 sec      CAPILLARY BLOOD GLUCOSE      POCT Blood Glucose.: 99 mg/dL (26 Sep 2020 07:38)  POCT Blood Glucose.: 104 mg/dL (25 Sep 2020 21:47)  POCT Blood Glucose.: 127 mg/dL (25 Sep 2020 16:36)  POCT Blood Glucose.: 163 mg/dL (25 Sep 2020 12:17)  POCT Blood Glucose.: 165 mg/dL (25 Sep 2020 10:59)    COVID-19 PCR: NotDetec (23 Sep 2020 16:01)    Radiology: Reviewed.   < from: CT Abdomen and Pelvis w/ IV Cont (09.23.20 @ 13:26) >  EXAM:  CT ABDOMEN AND PELVIS IC                            PROCEDURE DATE:  09/23/2020          INTERPRETATION:  CT of the abdomen and pelvis with IV contrast    Clinical Indication: rectal bleeding    Technique: Axial multidetector CT images of the abdomen and pelvis are acquired following the administration of IV contrast (90 cc Omnipaque-350 administered, 10 cc discarded).    Comparison: None.    Findings: Limited sections through the lung bases demonstrate mild dependent pleural parenchymalchanges bilaterally.    The liver, gallbladder, common bile duct, pancreas, spleen, and the right adrenal appear unremarkable. Nonspecific small 1.4 cm left adrenal nodule.    There is a 1.9 cm indeterminate hypodense lesion with a Hounsfield unit of29 in the left kidney. There are other tiny hypodense lesions in the kidneys bilaterally, too small to characterize. No evidence for a ureteral calculus. No hydronephrosis.    The appendix appears normal. Apparent 4.2 cm lobulated masslike structure in the sigmoid colon (image 93 series 2). No bowel obstruction, or grossly thickened bowel wall.    Small fat-containing periumbilical hernia.    No evidence of free air, ascites, or enlarged lymph node.    The urinary bladder is within normal limits.The prostate appears grossly unremarkable.    Impression:    Apparent 4.2 cm lobulated masslike structure in the sigmoid colon. Colonoscopy is recommended to rule out a colon cancer.    Indeterminate 1.9 cm hypodense left renal lesion. If clinically indicated, renal ultrasound may be pursued for further evaluation.                        AMBROSE MEDINA M.D., ATTENDING RADIOLOGIST  This document has been electronically signed. Sep 23 2020  2:00PM    < end of copied text >      ORT Score -   Family Hx of substance abuse	Female	      Male  Alcohol 	                                           1                     3  Illegal drugs	                                   2                     3  Rx drugs                                           4 	                  4  Personal Hx of substance abuse		  Alcohol 	                                          3	                  3  Illegal drugs                                     4	                  4  Rx drugs                                            5 	                  5  Age between 16- 45 years	           1                     1  hx preadolescent sexual abuse	   3 	                  0  Psychological disease		  ADD, OCD, bipolar, schizophrenia   2	          2  Depression                                           1 	          1  Total: 0    a score of 3 or lower indicates low risk for opioid abuse		  a score of 4-7 indicates moderate risk for opioid abuse		  a score of 8 or higher indicates high risk for opioid abuse    REVIEW OF SYSTEMS:  CONSTITUTIONAL: No fever or fatigue  RESPIRATORY: No cough, wheezing, chills or hemoptysis; No shortness of breath  CARDIOVASCULAR: No chest pain, palpitations, dizziness, or leg swelling  GASTROINTESTINAL: + abdominal soreness. No nausea, vomiting; No diarrhea or constipation. + blood with BM at home   GENITOURINARY: No dysuria, frequency, hematuria, retention or incontinence  MUSCULOSKELETAL: No joint pain or swelling; No muscle, back, or extremity pain, no upper or lower motor strength weakness, no saddle anesthesia, bowel/bladder incontinence  NEURO: No numbness/ tingling in b/l LE   PSYCHIATRIC: No depression, anxiety, mood swings, or difficulty sleeping    PHYSICAL EXAM:  GENERAL:  Alert & Oriented X3, NAD, Good concentration  CHEST/LUNG: Diminished, Clear to auscultation bilaterally; No rales, rhonchi, wheezing, or rubs  HEART: Regular rate and rhythm; No murmurs, rubs, or gallops  ABDOMEN: Soft, Appropriately tender to palpation, midline dressing c/d/i with EVE drainage in place. Nondistended, No bowel sounds auscultated   EXTREMITIES:  2+ Peripheral Pulses, No cyanosis, or edema  MUSCULOSKELETAL: Motor Strength 5/5 B/L upper and lower extremities; moves all extremities equally against gravity; ROM intact  SKIN: No rashes or lesions    Risk factors associated with adverse outcomes related to opioid treatment  [ ]  Concurrent benzodiazepine use  [ ]  History/ Active substance use or alcohol use disorder  [ ] Psychiatric co-morbidity  [ ] Sleep apnea  [ ] COPD  [ ] BMI> 35  [ ] Liver dysfunction  [ ] Renal dysfunction  [ ] CHF  [X] Former Smoker  [ ]  Age > 60 years    [X ]  [place]  Reviewed and Copied to Chart. See below.    Plan of care and goal oriented pain management treatment options were discussed with patient and /or primary care giver; all questions and concerns were addressed and care was aligned with patient's wishes.    Educated patient on goal oriented pain management treatment options     09-26-20 @ 10:33      
  Source of information: CHUY WALSH, Chart review  Patient language: Gren[place]  : n/a, pt refused  services today. Pt able to speak and communicate in English    HPI:  Patient is a 65 year old male with PMHx of diabetes, extensive smoking history presenting with chief complaint of bright red blood per rectum. Patient first noticed blood when he was wiping after a bowel movement and noted red streaks. Later on in the day, patient noted red blood dripping from his rectum while urinating. Patient has no other complaints. Denied fever, chills cough, SOB, chest pain, abdominal pain, constipation, diarrhea, change in stool consistency, weight loss.    IN the ED, patient was hemodynamically stable, hemoglobin of 12.6. Patient noted to have a bowel movement in the ED that turned the toilet water red. CT abd was done that showed a mass in the sigmoid colon. GI, Dr. Montez was consulted and patient was started on bowel prep for colonoscopy.  (23 Sep 2020 23:55)      Patient is a 65y old  Male who presents with a chief complaint of bright red blood per rectum. Pt is s/p partial sigmoid laparoscopic colectomy 9/25, POD #4.  Pt seen and examined at bedside. Reports mild abdominal soreness, tolerable at this time, reports pain score 5/10. Reports soreness is exacerbated by movement and palpation. Pt tolerating regular diet. Denies lethargy, nausea, vomiting, constipation. Reports having bright red blood per rectum with BM at home. Pt reports passing flatus, has not yet had a BM. Reports ambulating frequently in the hallway with tolerable pain control. Pt reports moderate itchiness in b/l arms, neck and head. + erythemic rash noted. Oxycodone discontinued 9/28. Patient stated goal for pain control: to be able to take deep breaths, get out of bed to chair and ambulate with tolerable pain control. Pt denies taking medications for pain at home.     PAST MEDICAL & SURGICAL HISTORY:  Diabetes mellitus    Social History:  smoked 2packs a year for 20-25 years. Has quit smoking for many years. Occasional drinker. (23 Sep 2020 23:55)   [X ] Denies illicit drug use     Allergies    No Known Allergies    MEDICATIONS  (STANDING):  dextrose 5%. 1000 milliLiter(s) (50 mL/Hr) IV Continuous <Continuous>  dextrose 50% Injectable 12.5 Gram(s) IV Push once  dextrose 50% Injectable 25 Gram(s) IV Push once  dextrose 50% Injectable 25 Gram(s) IV Push once  heparin   Injectable 5000 Unit(s) SubCutaneous every 8 hours  insulin lispro (HumaLOG) corrective regimen sliding scale   SubCutaneous three times a day before meals  pantoprazole  Injectable 40 milliGRAM(s) IV Push daily  sodium chloride 0.9% with potassium chloride 20 mEq/L 1000 milliLiter(s) (125 mL/Hr) IV Continuous <Continuous>    MEDICATIONS  (PRN):  acetaminophen   Tablet .. 650 milliGRAM(s) Oral every 6 hours PRN Moderate Pain (4 - 6)  dextrose 40% Gel 15 Gram(s) Oral once PRN Blood Glucose LESS THAN 70 milliGRAM(s)/deciliter  diphenhydrAMINE 25 milliGRAM(s) Oral every 6 hours PRN Rash and/or Itching  glucagon  Injectable 1 milliGRAM(s) IntraMuscular once PRN Glucose LESS THAN 70 milligrams/deciliter  traMADol 25 milliGRAM(s) Oral every 6 hours PRN Severe Pain (7 - 10)      Vital Signs Last 24 Hrs  T(C): 37.3 (29 Sep 2020 05:45), Max: 37.7 (28 Sep 2020 14:10)  T(F): 99.1 (29 Sep 2020 05:45), Max: 99.8 (28 Sep 2020 14:10)  HR: 73 (29 Sep 2020 05:45) (73 - 82)  BP: 118/65 (29 Sep 2020 05:45) (116/68 - 124/72)  BP(mean): --  RR: 17 (29 Sep 2020 05:45) (17 - 18)  SpO2: 97% (29 Sep 2020 05:45) (96% - 98%)  COVID-19 PCR: NotDetec (23 Sep 2020 16:01)    LABS: Reviewed                          9.0    7.08  )-----------( 275      ( 28 Sep 2020 07:42 )             27.7     09-28    138  |  106  |  4<L>  ----------------------------<  119<H>  3.8   |  28  |  0.73    Ca    8.5      28 Sep 2020 07:42  Phos  2.9     09-28  Mg     2.2     09-28            CAPILLARY BLOOD GLUCOSE      POCT Blood Glucose.: 125 mg/dL (29 Sep 2020 07:28)  POCT Blood Glucose.: 132 mg/dL (28 Sep 2020 21:55)  POCT Blood Glucose.: 142 mg/dL (28 Sep 2020 17:01)    COVID-19 PCR: NotDetec (23 Sep 2020 16:01)        Radiology: Reviewed.   < from: CT Abdomen and Pelvis w/ IV Cont (09.23.20 @ 13:26) >  EXAM:  CT ABDOMEN AND PELVIS IC                            PROCEDURE DATE:  09/23/2020          INTERPRETATION:  CT of the abdomen and pelvis with IV contrast    Clinical Indication: rectal bleeding    Technique: Axial multidetector CT images of the abdomen and pelvis are acquired following the administration of IV contrast (90 cc Omnipaque-350 administered, 10 cc discarded).    Comparison: None.    Findings: Limited sections through the lung bases demonstrate mild dependent pleural parenchymalchanges bilaterally.    The liver, gallbladder, common bile duct, pancreas, spleen, and the right adrenal appear unremarkable. Nonspecific small 1.4 cm left adrenal nodule.    There is a 1.9 cm indeterminate hypodense lesion with a Hounsfield unit of29 in the left kidney. There are other tiny hypodense lesions in the kidneys bilaterally, too small to characterize. No evidence for a ureteral calculus. No hydronephrosis.    The appendix appears normal. Apparent 4.2 cm lobulated masslike structure in the sigmoid colon (image 93 series 2). No bowel obstruction, or grossly thickened bowel wall.    Small fat-containing periumbilical hernia.    No evidence of free air, ascites, or enlarged lymph node.    The urinary bladder is within normal limits.The prostate appears grossly unremarkable.    Impression:    Apparent 4.2 cm lobulated masslike structure in the sigmoid colon. Colonoscopy is recommended to rule out a colon cancer.    Indeterminate 1.9 cm hypodense left renal lesion. If clinically indicated, renal ultrasound may be pursued for further evaluation.                        AMBROSE MEDINA M.D., ATTENDING RADIOLOGIST  This document has been electronically signed. Sep 23 2020  2:00PM    < end of copied text >      ORT Score -   Family Hx of substance abuse	Female	      Male  Alcohol 	                                           1                     3  Illegal drugs	                                   2                     3  Rx drugs                                           4 	                  4  Personal Hx of substance abuse		  Alcohol 	                                          3	                  3  Illegal drugs                                     4	                  4  Rx drugs                                            5 	                  5  Age between 16- 45 years	           1                     1  hx preadolescent sexual abuse	   3 	                  0  Psychological disease		  ADD, OCD, bipolar, schizophrenia   2	          2  Depression                                           1 	          1  Total: 0    a score of 3 or lower indicates low risk for opioid abuse		  a score of 4-7 indicates moderate risk for opioid abuse		  a score of 8 or higher indicates high risk for opioid abuse    REVIEW OF SYSTEMS:  CONSTITUTIONAL: No fever or fatigue  RESPIRATORY: No cough, wheezing, chills or hemoptysis; No shortness of breath  CARDIOVASCULAR: No chest pain, palpitations, dizziness, or leg swelling  GASTROINTESTINAL: + abdominal soreness. No nausea, vomiting; No diarrhea or constipation. + blood with BM at home; + passing flatus  GENITOURINARY: No dysuria, frequency, hematuria, retention or incontinence  MUSCULOSKELETAL: No joint pain or swelling; No muscle, back, or extremity pain, no upper or lower motor strength weakness, no saddle anesthesia, bowel/bladder incontinence  NEURO: No numbness/ tingling in b/l LE   PSYCHIATRIC: No depression, anxiety, mood swings, or difficulty sleeping  SKIN: + itchiness, + erythemic rash b/l arms, neck and head    PHYSICAL EXAM:  GENERAL:  Alert & Oriented X3, NAD, Good concentration  CHEST/LUNG: Diminished, Clear to auscultation bilaterally; No rales, rhonchi, wheezing, or rubs  HEART: Regular rate and rhythm; No murmurs, rubs, or gallops  ABDOMEN: Soft, Appropriately tender to palpation, midline dressing c/d/i with EVE drainage in place. Nondistended, + bowel sounds auscultated   EXTREMITIES:  2+ Peripheral Pulses, No cyanosis, or edema  MUSCULOSKELETAL: Motor Strength 5/5 B/L upper and lower extremities; moves all extremities equally against gravity; ROM intact  SKIN: + erythemic rash noted scattered on b/l arms, posterior neck and head.       Risk factors associated with adverse outcomes related to opioid treatment  [ ]  Concurrent benzodiazepine use  [ ]  History/ Active substance use or alcohol use disorder  [ ] Psychiatric co-morbidity  [ ] Sleep apnea  [ ] COPD  [ ] BMI> 35  [ ] Liver dysfunction  [ ] Renal dysfunction  [ ] CHF  [X] Former Smoker  [ ]  Age > 60 years    [X ]  [place]  Reviewed and Copied to Chart. See below.    Plan of care and goal oriented pain management treatment options were discussed with patient and /or primary care giver; all questions and concerns were addressed and care was aligned with patient's wishes.    Educated patient on goal oriented pain management treatment options     09-29-20 @ 11:32            
  Source of information: CHUY WALSH, Chart review  Patient language: [place]  : n/a, pt refused  services today. Pt able to speak and communicate in English    HPI:  Patient is a 65 year old male with PMHx of diabetes, extensive smoking history presenting with chief complaint of bright red blood per rectum. Patient first noticed blood when he was wiping after a bowel movement and noted red streaks. Later on in the day, patient noted red blood dripping from his rectum while urinating. Patient has no other complaints. Denied fever, chills cough, SOB, chest pain, abdominal pain, constipation, diarrhea, change in stool consistency, weight loss.    IN the ED, patient was hemodynamically stable, hemoglobin of 12.6. Patient noted to have a bowel movement in the ED that turned the toilet water red. CT abd was done that showed a mass in the sigmoid colon. GI, Dr. Montez was consulted and patient was started on bowel prep for colonoscopy.  (23 Sep 2020 23:55)      Patient is a 65y old  Male who presents with a chief complaint of bright red blood per rectum. Pt is s/p partial sigmoid laparoscopic colectomy 9/25, POD #3.  Pt seen and examined at bedside. Reports mild abdominal soreness, tolerable at this time, reports pain score 5/10. Reports soreness is exacerbated by movement and palpation. Pt tolerating clear liquid diet. Denies lethargy, nausea, vomiting, constipation. Reports having bright red blood per rectum with BM at home. Pt still has not yet passed flatus since surgery. Pt with + bowel sounds. Reports ambulating frequently in the hallway with tolerable pain control. Pt reports moderate itchiness in b/l arms, neck and head. + erythemic rash noted. Will discontinue oxycodone. Patient stated goal for pain control: to be able to take deep breaths, get out of bed to chair and ambulate with tolerable pain control. Pt denies taking medications for pain at home.     PAST MEDICAL & SURGICAL HISTORY:  Diabetes mellitus    Social History:  smoked 2packs a year for 20-25 years. Has quit smoking for many years. Occasional drinker. (23 Sep 2020 23:55)   [X ] Denies illicit drug use     Allergies    No Known Allergies    MEDICATIONS  (STANDING):  dextrose 5%. 1000 milliLiter(s) (50 mL/Hr) IV Continuous <Continuous>  dextrose 50% Injectable 12.5 Gram(s) IV Push once  dextrose 50% Injectable 25 Gram(s) IV Push once  dextrose 50% Injectable 25 Gram(s) IV Push once  heparin   Injectable 5000 Unit(s) SubCutaneous every 8 hours  insulin lispro (HumaLOG) corrective regimen sliding scale   SubCutaneous three times a day before meals  pantoprazole  Injectable 40 milliGRAM(s) IV Push daily  sodium chloride 0.9% with potassium chloride 20 mEq/L 1000 milliLiter(s) (125 mL/Hr) IV Continuous <Continuous>    MEDICATIONS  (PRN):  acetaminophen   Tablet .. 650 milliGRAM(s) Oral every 6 hours PRN Moderate Pain (4 - 6)  dextrose 40% Gel 15 Gram(s) Oral once PRN Blood Glucose LESS THAN 70 milliGRAM(s)/deciliter  diphenhydrAMINE 25 milliGRAM(s) Oral every 6 hours PRN Rash and/or Itching  glucagon  Injectable 1 milliGRAM(s) IntraMuscular once PRN Glucose LESS THAN 70 milligrams/deciliter  oxyCODONE    IR 5 milliGRAM(s) Oral every 4 hours PRN Severe Pain (7 - 10)      Vital Signs Last 24 Hrs  T(C): 37.1 (28 Sep 2020 06:25), Max: 37.1 (28 Sep 2020 06:25)  T(F): 98.7 (28 Sep 2020 06:25), Max: 98.7 (28 Sep 2020 06:25)  HR: 72 (28 Sep 2020 06:25) (72 - 84)  BP: 124/73 (28 Sep 2020 06:25) (114/76 - 125/77)  BP(mean): --  RR: 18 (28 Sep 2020 06:25) (18 - 18)  SpO2: 98% (28 Sep 2020 06:25) (97% - 98%)  COVID-19 PCR: NotDetec (23 Sep 2020 16:01)    LABS: Reviewed                          9.0    7.08  )-----------( 275      ( 28 Sep 2020 07:42 )             27.7     09-28    138  |  106  |  4<L>  ----------------------------<  119<H>  3.8   |  28  |  0.73    Ca    8.5      28 Sep 2020 07:42  Phos  2.9     09-28  Mg     2.2     09-28            CAPILLARY BLOOD GLUCOSE      POCT Blood Glucose.: 139 mg/dL (28 Sep 2020 07:35)  POCT Blood Glucose.: 126 mg/dL (27 Sep 2020 16:28)  POCT Blood Glucose.: 144 mg/dL (27 Sep 2020 11:46)    COVID-19 PCR: NotDetec (23 Sep 2020 16:01)    Radiology: Reviewed.   < from: CT Abdomen and Pelvis w/ IV Cont (09.23.20 @ 13:26) >  EXAM:  CT ABDOMEN AND PELVIS IC                            PROCEDURE DATE:  09/23/2020          INTERPRETATION:  CT of the abdomen and pelvis with IV contrast    Clinical Indication: rectal bleeding    Technique: Axial multidetector CT images of the abdomen and pelvis are acquired following the administration of IV contrast (90 cc Omnipaque-350 administered, 10 cc discarded).    Comparison: None.    Findings: Limited sections through the lung bases demonstrate mild dependent pleural parenchymalchanges bilaterally.    The liver, gallbladder, common bile duct, pancreas, spleen, and the right adrenal appear unremarkable. Nonspecific small 1.4 cm left adrenal nodule.    There is a 1.9 cm indeterminate hypodense lesion with a Hounsfield unit of29 in the left kidney. There are other tiny hypodense lesions in the kidneys bilaterally, too small to characterize. No evidence for a ureteral calculus. No hydronephrosis.    The appendix appears normal. Apparent 4.2 cm lobulated masslike structure in the sigmoid colon (image 93 series 2). No bowel obstruction, or grossly thickened bowel wall.    Small fat-containing periumbilical hernia.    No evidence of free air, ascites, or enlarged lymph node.    The urinary bladder is within normal limits.The prostate appears grossly unremarkable.    Impression:    Apparent 4.2 cm lobulated masslike structure in the sigmoid colon. Colonoscopy is recommended to rule out a colon cancer.    Indeterminate 1.9 cm hypodense left renal lesion. If clinically indicated, renal ultrasound may be pursued for further evaluation.                        AMBROSE MEDINA M.D., ATTENDING RADIOLOGIST  This document has been electronically signed. Sep 23 2020  2:00PM    < end of copied text >      ORT Score -   Family Hx of substance abuse	Female	      Male  Alcohol 	                                           1                     3  Illegal drugs	                                   2                     3  Rx drugs                                           4 	                  4  Personal Hx of substance abuse		  Alcohol 	                                          3	                  3  Illegal drugs                                     4	                  4  Rx drugs                                            5 	                  5  Age between 16- 45 years	           1                     1  hx preadolescent sexual abuse	   3 	                  0  Psychological disease		  ADD, OCD, bipolar, schizophrenia   2	          2  Depression                                           1 	          1  Total: 0    a score of 3 or lower indicates low risk for opioid abuse		  a score of 4-7 indicates moderate risk for opioid abuse		  a score of 8 or higher indicates high risk for opioid abuse    REVIEW OF SYSTEMS:  CONSTITUTIONAL: No fever or fatigue  RESPIRATORY: No cough, wheezing, chills or hemoptysis; No shortness of breath  CARDIOVASCULAR: No chest pain, palpitations, dizziness, or leg swelling  GASTROINTESTINAL: + abdominal soreness. No nausea, vomiting; No diarrhea or constipation. + blood with BM at home   GENITOURINARY: No dysuria, frequency, hematuria, retention or incontinence  MUSCULOSKELETAL: No joint pain or swelling; No muscle, back, or extremity pain, no upper or lower motor strength weakness, no saddle anesthesia, bowel/bladder incontinence  NEURO: No numbness/ tingling in b/l LE   PSYCHIATRIC: No depression, anxiety, mood swings, or difficulty sleeping  SKIN: + itchiness, + erythemic rash b/l arms, neck and head    PHYSICAL EXAM:  GENERAL:  Alert & Oriented X3, NAD, Good concentration  CHEST/LUNG: Diminished, Clear to auscultation bilaterally; No rales, rhonchi, wheezing, or rubs  HEART: Regular rate and rhythm; No murmurs, rubs, or gallops  ABDOMEN: Soft, Appropriately tender to palpation, midline dressing c/d/i with EVE drainage in place. Nondistended, + bowel sounds auscultated   EXTREMITIES:  2+ Peripheral Pulses, No cyanosis, or edema  MUSCULOSKELETAL: Motor Strength 5/5 B/L upper and lower extremities; moves all extremities equally against gravity; ROM intact  SKIN: + erythemic rash noted scattered on b/l arms, posterior neck and head.       Risk factors associated with adverse outcomes related to opioid treatment  [ ]  Concurrent benzodiazepine use  [ ]  History/ Active substance use or alcohol use disorder  [ ] Psychiatric co-morbidity  [ ] Sleep apnea  [ ] COPD  [ ] BMI> 35  [ ] Liver dysfunction  [ ] Renal dysfunction  [ ] CHF  [X] Former Smoker  [ ]  Age > 60 years    [X ]  [place]  Reviewed and Copied to Chart. See below.    Plan of care and goal oriented pain management treatment options were discussed with patient and /or primary care giver; all questions and concerns were addressed and care was aligned with patient's wishes.    Educated patient on goal oriented pain management treatment options     09-28-20 @ 10:07        
INTERVAL HPI/OVERNIGHT EVENTS:    No acute events overnight.   Pt resting comfortably.   no flatus  denies n/v/f/c      MEDICATIONS  (STANDING):  dextrose 5%. 1000 milliLiter(s) (50 mL/Hr) IV Continuous <Continuous>  dextrose 50% Injectable 12.5 Gram(s) IV Push once  dextrose 50% Injectable 25 Gram(s) IV Push once  dextrose 50% Injectable 25 Gram(s) IV Push once  heparin   Injectable 5000 Unit(s) SubCutaneous every 8 hours  insulin lispro (HumaLOG) corrective regimen sliding scale   SubCutaneous three times a day before meals  pantoprazole  Injectable 40 milliGRAM(s) IV Push daily  sodium chloride 0.9% with potassium chloride 20 mEq/L 1000 milliLiter(s) (125 mL/Hr) IV Continuous <Continuous>    MEDICATIONS  (PRN):  acetaminophen   Tablet .. 650 milliGRAM(s) Oral every 6 hours PRN Moderate Pain (4 - 6)  dextrose 40% Gel 15 Gram(s) Oral once PRN Blood Glucose LESS THAN 70 milliGRAM(s)/deciliter  glucagon  Injectable 1 milliGRAM(s) IntraMuscular once PRN Glucose LESS THAN 70 milligrams/deciliter  oxyCODONE    IR 5 milliGRAM(s) Oral every 4 hours PRN Severe Pain (7 - 10)      Vital Signs Last 24 Hrs  T(C): 36.7 (26 Sep 2020 05:56), Max: 37.4 (26 Sep 2020 00:05)  T(F): 98.1 (26 Sep 2020 05:56), Max: 99.4 (26 Sep 2020 00:05)  HR: 87 (26 Sep 2020 05:56) (68 - 985)  BP: 121/68 (26 Sep 2020 05:56) (110/57 - 138/82)  BP(mean): 92 (25 Sep 2020 11:46) (92 - 92)  RR: 17 (26 Sep 2020 05:56) (15 - 18)  SpO2: 97% (26 Sep 2020 05:56) (97% - 100%)      PHYSICAL EXAM  General: Alert and oriented, not in acute distress  Resp: Breathing unlabored  Abdomen: Soft, nondistended, EVE dressing intact, incisional tenderness  : estes in place, clear yellow urine  Extremities: No pedal edema    I&O's Detail    25 Sep 2020 07:01  -  26 Sep 2020 07:00  --------------------------------------------------------  IN:    Lactated Ringers Bolus: 1600 mL    sodium chloride 0.9% w/ Additives: 1500 mL  Total IN: 3100 mL    OUT:    Indwelling Catheter - Urethral (mL): 1525 mL  Total OUT: 1525 mL    Total NET: 1575 mL          LABS:                        9.6    11.62 )-----------( 216      ( 26 Sep 2020 07:33 )             27.8             09-26    141  |  109<H>  |  8   ----------------------------<  89  3.9   |  24  |  0.79    Ca    8.0<L>      26 Sep 2020 07:33        
INTERVAL HPI/OVERNIGHT EVENTS:  No acute events overnight. No flatus/BM. Ambulating. Tolerating diet.     MEDICATIONS  (STANDING):  dextrose 5%. 1000 milliLiter(s) (50 mL/Hr) IV Continuous <Continuous>  dextrose 50% Injectable 12.5 Gram(s) IV Push once  dextrose 50% Injectable 25 Gram(s) IV Push once  dextrose 50% Injectable 25 Gram(s) IV Push once  heparin   Injectable 5000 Unit(s) SubCutaneous every 8 hours  insulin lispro (HumaLOG) corrective regimen sliding scale   SubCutaneous three times a day before meals  pantoprazole  Injectable 40 milliGRAM(s) IV Push daily  sodium chloride 0.9% with potassium chloride 20 mEq/L 1000 milliLiter(s) (125 mL/Hr) IV Continuous <Continuous>    MEDICATIONS  (PRN):  acetaminophen   Tablet .. 650 milliGRAM(s) Oral every 6 hours PRN Moderate Pain (4 - 6)  dextrose 40% Gel 15 Gram(s) Oral once PRN Blood Glucose LESS THAN 70 milliGRAM(s)/deciliter  diphenhydrAMINE 25 milliGRAM(s) Oral every 6 hours PRN Rash and/or Itching  glucagon  Injectable 1 milliGRAM(s) IntraMuscular once PRN Glucose LESS THAN 70 milligrams/deciliter  oxyCODONE    IR 5 milliGRAM(s) Oral every 4 hours PRN Severe Pain (7 - 10)      Vital Signs Last 24 Hrs  T(C): 37.1 (28 Sep 2020 06:25), Max: 37.1 (28 Sep 2020 06:25)  T(F): 98.7 (28 Sep 2020 06:25), Max: 98.7 (28 Sep 2020 06:25)  HR: 72 (28 Sep 2020 06:25) (72 - 84)  BP: 124/73 (28 Sep 2020 06:25) (114/76 - 125/77)  RR: 18 (28 Sep 2020 06:25) (18 - 18)  SpO2: 98% (28 Sep 2020 06:25) (97% - 98%)    Physical:  General: awake, alert, NAD, appears comfortable  Respiratory: nonlabored breathing  Abdomen: Soft nondistended, nontender, EVE in place with good seal    I&O's Detail  27 Sep 2020 07:01  -  28 Sep 2020 07:00  --------------------------------------------------------  IN:  Total IN: 0 mL    OUT:    Voided (mL): 600 mL  Total OUT: 600 mL  Total NET: -600 mL    LABS:                      9.0    7.08  )-----------( 275      ( 28 Sep 2020 07:42 )             27.7             09-28    138  |  106  |  4<L>  ----------------------------<  119<H>  3.8   |  28  |  0.73    Ca    8.5      28 Sep 2020 07:42  Phos  2.9     09-28  Mg     2.2     09-28    
INTERVAL HPI/OVERNIGHT EVENTS:  Patient seen at bedside,doing better,had gases  VITAL SIGNS:  T(F): 97.5 (09-29-20 @ 13:22)  HR: 85 (09-29-20 @ 13:22)  BP: 120/72 (09-29-20 @ 13:22)  RR: 18 (09-29-20 @ 13:22)  SpO2: 96% (09-29-20 @ 13:22)  Wt(kg): --    PHYSICAL EXAM:  awake,no dystress  Constitutional:  Eyes:  ENMT:perrla  Neck:  Respiratory:clear  Cardiovascular:s1 s2,m-none  Gastrointestinal:soft,bs pos  Extremities:  Vascular:  Neurological:no focal defiocit  Musculoskeletal:    MEDICATIONS  (STANDING):  dextrose 5%. 1000 milliLiter(s) (50 mL/Hr) IV Continuous <Continuous>  dextrose 50% Injectable 12.5 Gram(s) IV Push once  dextrose 50% Injectable 25 Gram(s) IV Push once  dextrose 50% Injectable 25 Gram(s) IV Push once  heparin   Injectable 5000 Unit(s) SubCutaneous every 8 hours  insulin lispro (HumaLOG) corrective regimen sliding scale   SubCutaneous three times a day before meals  pantoprazole  Injectable 40 milliGRAM(s) IV Push daily  sodium chloride 0.9% with potassium chloride 20 mEq/L 1000 milliLiter(s) (125 mL/Hr) IV Continuous <Continuous>    MEDICATIONS  (PRN):  acetaminophen   Tablet .. 650 milliGRAM(s) Oral every 6 hours PRN Moderate Pain (4 - 6)  dextrose 40% Gel 15 Gram(s) Oral once PRN Blood Glucose LESS THAN 70 milliGRAM(s)/deciliter  diphenhydrAMINE 25 milliGRAM(s) Oral every 6 hours PRN Rash and/or Itching  glucagon  Injectable 1 milliGRAM(s) IntraMuscular once PRN Glucose LESS THAN 70 milligrams/deciliter  traMADol 25 milliGRAM(s) Oral every 6 hours PRN Severe Pain (7 - 10)      Allergies    Drug Allergies Not Recorded  Orange (Pruritus; Rash)    Intolerances        LABS:                        9.0    7.08  )-----------( 275      ( 28 Sep 2020 07:42 )             27.7     09-28    138  |  106  |  4<L>  ----------------------------<  119<H>  3.8   |  28  |  0.73    Ca    8.5      28 Sep 2020 07:42  Phos  2.9     09-28  Mg     2.2     09-28      Assessment and Plan:    Assessment:  · Assessment	  Patient is a 65 year old male with PMHx of diabetes presenting with bright red blood per rectum and CT findings of 4.2cm mass in the sigmoid colon concerning for malignancy. Admitted for further workup including colonoscopy.      Problem/Plan - 1:  ·  Problem: Colonic mass-s/p resection  GI, Dr. Montez was consulted  -f/up surgery team  -pain meds  -encourage ambulation  -advance diet  -had gases relieved and waiting for bm  -possible d/c tomorrow       Problem/Plan - 2:  ·  Problem: Diabetes mellitus.  Plan: Hx of DM on Metformin 1000mg BID.  - Hold oral medications, started on insulin sliding scale.  Follow up hgb a1c.      Problem/Plan - 3:  ·  Problem: Anemia.  Plan: With hgb of 12.6  Likely iron def from bleed vs AOCD from underlying malignancy  Follow up iron panel.      Problem/Plan - 4:  ·  Problem: Need for prophylactic measure.  Plan: IMPROVE VTE score: 1  Will manage with: Hold anticoagulation in the setting of ongoing GI Bleed.    [ ] Previous VTE                                    3  [ ] Thrombophilia                                  2  [] Lower limb paralysis                        2  (unable to hold up >15 seconds)    [ ] Current Cancer (within 6 months)        2   [] Immobilization > 24 hrs                    1  [ ] ICU/CCU stay > 24 hrs                      1  [x] Age > 60                                         1.       
INTERVAL HPI/OVERNIGHT EVENTS:  Patient seen at bedside,doing well post surgery,no dystress  VITAL SIGNS:  T(F): 98.1 (09-26-20 @ 05:56)  HR: 87 (09-26-20 @ 05:56)  BP: 121/68 (09-26-20 @ 05:56)  RR: 17 (09-26-20 @ 05:56)  SpO2: 97% (09-26-20 @ 05:56)  Wt(kg): --    PHYSICAL EXAM:  awake,alert  Constitutional:  Eyes:  ENMT:perrla  Neck:  Respiratory:clear  Cardiovascular:s1 s2,m-none  Gastrointestinal:soft,bs pos  Extremities:  Vascular:  Neurological:no focal deficit  Musculoskeletal:    MEDICATIONS  (STANDING):  dextrose 5%. 1000 milliLiter(s) (50 mL/Hr) IV Continuous <Continuous>  dextrose 50% Injectable 12.5 Gram(s) IV Push once  dextrose 50% Injectable 25 Gram(s) IV Push once  dextrose 50% Injectable 25 Gram(s) IV Push once  heparin   Injectable 5000 Unit(s) SubCutaneous every 8 hours  insulin lispro (HumaLOG) corrective regimen sliding scale   SubCutaneous three times a day before meals  pantoprazole  Injectable 40 milliGRAM(s) IV Push daily  sodium chloride 0.9% with potassium chloride 20 mEq/L 1000 milliLiter(s) (125 mL/Hr) IV Continuous <Continuous>    MEDICATIONS  (PRN):  acetaminophen   Tablet .. 650 milliGRAM(s) Oral every 6 hours PRN Moderate Pain (4 - 6)  dextrose 40% Gel 15 Gram(s) Oral once PRN Blood Glucose LESS THAN 70 milliGRAM(s)/deciliter  glucagon  Injectable 1 milliGRAM(s) IntraMuscular once PRN Glucose LESS THAN 70 milligrams/deciliter  oxyCODONE    IR 5 milliGRAM(s) Oral every 4 hours PRN Severe Pain (7 - 10)      Allergies    No Known Allergies    Intolerances        LABS:                        9.6    11.62 )-----------( 216      ( 26 Sep 2020 07:33 )             27.8     09-26    141  |  109<H>  |  8   ----------------------------<  89  3.9   |  24  |  0.79    Ca    8.0<L>      26 Sep 2020 07:33      PT/INR - ( 25 Sep 2020 07:21 )   PT: 15.0 sec;   INR: 1.30 ratio         PTT - ( 25 Sep 2020 07:21 )  PTT:27.0 sec    Assessment and Plan:    Assessment:  · Assessment	  Patient is a 65 year old male with PMHx of diabetes presenting with bright red blood per rectum and CT findings of 4.2cm mass in the sigmoid colon concerning for malignancy. Admitted for further workup including colonoscopy.      Problem/Plan - 1:  ·  Problem: Colonic mass-s/p resection  GI, Dr. Montez was consulted  -f/up surgery team  -pain meds  -encourage ambulation  -d/c estes   -advance diet     Problem/Plan - 2:  ·  Problem: Diabetes mellitus.  Plan: Hx of DM on Metformin 1000mg BID.  - Hold oral medications, started on insulin sliding scale.  Follow up hgb a1c.      Problem/Plan - 3:  ·  Problem: Anemia.  Plan: With hgb of 12.6  Likely iron def from bleed vs AOCD from underlying malignancy  Follow up iron panel.      Problem/Plan - 4:  ·  Problem: Need for prophylactic measure.  Plan: IMPROVE VTE score: 1  Will manage with: Hold anticoagulation in the setting of ongoing GI Bleed.    [ ] Previous VTE                                    3  [ ] Thrombophilia                                  2  [] Lower limb paralysis                        2  (unable to hold up >15 seconds)    [ ] Current Cancer (within 6 months)        2   [] Immobilization > 24 hrs                    1  [ ] ICU/CCU stay > 24 hrs                      1  [x] Age > 60                                         1.       
INTERVAL HPI/OVERNIGHT EVENTS:  Patient seen,no acute issues comfortable  VITAL SIGNS:  T(F): 97.8 (09-30-20 @ 05:23)  HR: 68 (09-30-20 @ 05:23)  BP: 119/69 (09-30-20 @ 05:23)  RR: 17 (09-30-20 @ 05:23)  SpO2: 97% (09-30-20 @ 05:23)  Wt(kg): --    PHYSICAL EXAM:  awake,alert  Constitutional:  Eyes:  ENMT:perrla  Neck:  Respiratory:cl;ear  Cardiovascular:s1 s2,m-none  Gastrointestinal:soft,bs pos  Extremities:  Vascular:  Neurological:no focal deficit  Musculoskeletal:    MEDICATIONS  (STANDING):  dextrose 5%. 1000 milliLiter(s) (50 mL/Hr) IV Continuous <Continuous>  dextrose 50% Injectable 12.5 Gram(s) IV Push once  dextrose 50% Injectable 25 Gram(s) IV Push once  dextrose 50% Injectable 25 Gram(s) IV Push once  heparin   Injectable 5000 Unit(s) SubCutaneous every 8 hours  insulin lispro (HumaLOG) corrective regimen sliding scale   SubCutaneous three times a day before meals  pantoprazole  Injectable 40 milliGRAM(s) IV Push daily  sodium chloride 0.9% with potassium chloride 20 mEq/L 1000 milliLiter(s) (125 mL/Hr) IV Continuous <Continuous>    MEDICATIONS  (PRN):  acetaminophen   Tablet .. 650 milliGRAM(s) Oral every 6 hours PRN Moderate Pain (4 - 6)  dextrose 40% Gel 15 Gram(s) Oral once PRN Blood Glucose LESS THAN 70 milliGRAM(s)/deciliter  diphenhydrAMINE 25 milliGRAM(s) Oral every 6 hours PRN Rash and/or Itching  glucagon  Injectable 1 milliGRAM(s) IntraMuscular once PRN Glucose LESS THAN 70 milligrams/deciliter  traMADol 25 milliGRAM(s) Oral every 6 hours PRN Severe Pain (7 - 10)      Allergies    Drug Allergies Not Recorded  Orange (Pruritus; Rash)    Intolerances        LABS:                        9.0    7.08  )-----------( 275      ( 28 Sep 2020 07:42 )             27.7     09-28    138  |  106  |  4<L>  ----------------------------<  119<H>  3.8   |  28  |  0.73    Ca    8.5      28 Sep 2020 07:42  Phos  2.9     09-28  Mg     2.2     09-28     Assessment:  · Assessment	  Patient is a 65 year old male with PMHx of diabetes presenting with bright red blood per rectum and CT findings of 4.2cm mass in the sigmoid colon concerning for malignancy. Admitted for further workup including colonoscopy.      Problem/Plan - 1:  ·  Problem: Colonic mass-s/p resection  -f/up surgery team  -pain meds  -encourage ambulation  -advance diet  -had gases relieved and waiting for bm  -possible d/c today       Problem/Plan - 2:  ·  Problem: Diabetes mellitus.  Plan: Hx of DM on Metformin 1000mg BID.  - Hold oral medications, started on insulin sliding scale.  Follow up hgb a1c.      Problem/Plan - 3:  ·  Problem: Anemia.  Plan: With hgb of 12.6  Follow up iron panel.      Problem/Plan - 4:  ·  Problem: Need for prophylactic measure.  Plan: IMPROVE VTE score: 1  Will manage with: Hold anticoagulation in the setting of ongoing GI Bleed.    [ ] Previous VTE                                    3  [ ] Thrombophilia                                  2  [] Lower limb paralysis                        2  (unable to hold up >15 seconds)    [ ] Current Cancer (within 6 months)        2   [] Immobilization > 24 hrs                    1  [ ] ICU/CCU stay > 24 hrs                      1  [x] Age > 60                                         1.   FOR FURTHER COVERAGE TILL 10/05 PLEASE CALL DR CORDOVA    
INTERVAL HPI/OVERNIGHT EVENTS:  Patient stable clinically,no acute events  VITAL SIGNS:  T(F): 99.8 (09-28-20 @ 14:10)  HR: 82 (09-28-20 @ 14:10)  BP: 124/72 (09-28-20 @ 14:10)  RR: 18 (09-28-20 @ 14:10)  SpO2: 96% (09-28-20 @ 14:10)  Wt(kg): --    PHYSICAL EXAM:  awake  Constitutional:  Eyes:  ENMT:perrla  Neck:  Respiratory:clear  Cardiovascular:s1 2,m-none  Gastrointestinal:soft,bs pos  Extremities:  Vascular:  Neurological:no focal deficit  Musculoskeletal:    MEDICATIONS  (STANDING):  dextrose 5%. 1000 milliLiter(s) (50 mL/Hr) IV Continuous <Continuous>  dextrose 50% Injectable 12.5 Gram(s) IV Push once  dextrose 50% Injectable 25 Gram(s) IV Push once  dextrose 50% Injectable 25 Gram(s) IV Push once  heparin   Injectable 5000 Unit(s) SubCutaneous every 8 hours  insulin lispro (HumaLOG) corrective regimen sliding scale   SubCutaneous three times a day before meals  pantoprazole  Injectable 40 milliGRAM(s) IV Push daily  sodium chloride 0.9% with potassium chloride 20 mEq/L 1000 milliLiter(s) (125 mL/Hr) IV Continuous <Continuous>    MEDICATIONS  (PRN):  acetaminophen   Tablet .. 650 milliGRAM(s) Oral every 6 hours PRN Moderate Pain (4 - 6)  dextrose 40% Gel 15 Gram(s) Oral once PRN Blood Glucose LESS THAN 70 milliGRAM(s)/deciliter  diphenhydrAMINE 25 milliGRAM(s) Oral every 6 hours PRN Rash and/or Itching  glucagon  Injectable 1 milliGRAM(s) IntraMuscular once PRN Glucose LESS THAN 70 milligrams/deciliter  traMADol 25 milliGRAM(s) Oral every 6 hours PRN Severe Pain (7 - 10)      Allergies    No Known Allergies    Intolerances        LABS:                        9.0    7.08  )-----------( 275      ( 28 Sep 2020 07:42 )             27.7     09-28    138  |  106  |  4<L>  ----------------------------<  119<H>  3.8   |  28  |  0.73    Ca    8.5      28 Sep 2020 07:42  Phos  2.9     09-28  Mg     2.2     09-28      Assessment and Plan:    Assessment:  · Assessment	  Patient is a 65 year old male with PMHx of diabetes presenting with bright red blood per rectum and CT findings of 4.2cm mass in the sigmoid colon concerning for malignancy. Admitted for further workup including colonoscopy.      Problem/Plan - 1:  ·  Problem: Colonic mass-s/p resection  GI, Dr. Montez was consulted  -f/up surgery team  -pain meds  -encourage ambulation  -advance diet  -d/c planning once cleared by surgery     Problem/Plan - 2:  ·  Problem: Diabetes mellitus.  Plan: Hx of DM on Metformin 1000mg BID.  - Hold oral medications, started on insulin sliding scale.  Follow up hgb a1c.      Problem/Plan - 3:  ·  Problem: Anemia.  Plan: With hgb of 12.6  Likely iron def from bleed vs AOCD from underlying malignancy  Follow up iron panel.      Problem/Plan - 4:  ·  Problem: Need for prophylactic measure.  Plan: IMPROVE VTE score: 1  Will manage with: Hold anticoagulation in the setting of ongoing GI Bleed.    [ ] Previous VTE                                    3  [ ] Thrombophilia                                  2  [] Lower limb paralysis                        2  (unable to hold up >15 seconds)    [ ] Current Cancer (within 6 months)        2   [] Immobilization > 24 hrs                    1  [ ] ICU/CCU stay > 24 hrs                      1  [x] Age > 60                                         1.     
INTERVAL HPI/OVERNIGHT EVENTS:  Pt stable.   Tolerating diet.   flatus, no BM.    Vital Signs Last 24 Hrs  T(C): 36.4 (29 Sep 2020 13:22), Max: 37.3 (28 Sep 2020 21:38)  T(F): 97.5 (29 Sep 2020 13:22), Max: 99.2 (28 Sep 2020 21:38)  HR: 85 (29 Sep 2020 13:22) (73 - 85)  BP: 120/72 (29 Sep 2020 13:22) (116/68 - 120/72)  BP(mean): --  RR: 18 (29 Sep 2020 13:22) (17 - 18)  SpO2: 96% (29 Sep 2020 13:22) (96% - 98%)    Physical:  Abdomen: Soft nondistended, nontender.  EVE dressing in place.    I&O's Summary      LABS:                        9.0    7.08  )-----------( 275      ( 28 Sep 2020 07:42 )             27.7             09-28    138  |  106  |  4<L>  ----------------------------<  119<H>  3.8   |  28  |  0.73    Ca    8.5      28 Sep 2020 07:42  Phos  2.9     09-28  Mg     2.2     09-28    
INTERVAL HPI/OVERNIGHT EVENTS:  Pt stable.   Tolerating diet.   no flatus/BM.  Ambulating.    Vital Signs Last 24 Hrs  T(C): 37.1 (27 Sep 2020 05:22), Max: 37.2 (26 Sep 2020 14:00)  T(F): 98.8 (27 Sep 2020 05:22), Max: 98.9 (26 Sep 2020 14:00)  HR: 81 (27 Sep 2020 05:22) (81 - 89)  BP: 128/84 (27 Sep 2020 05:22) (118/74 - 133/69)  BP(mean): --  RR: 18 (27 Sep 2020 05:22) (18 - 18)  SpO2: 95% (27 Sep 2020 05:22) (95% - 99%)    Physical:  Abdomen: Soft nondistended, nontender.  Dressings dry and intact, EVE dressing in place.    I&O's Summary    26 Sep 2020 07:01  -  27 Sep 2020 07:00  --------------------------------------------------------  IN: 1500 mL / OUT: 700 mL / NET: 800 mL        LABS:                        9.4    8.06  )-----------( 247      ( 27 Sep 2020 07:05 )             29.0             09-27    138  |  107  |  5<L>  ----------------------------<  126<H>  4.0   |  26  |  0.72    Ca    8.6      27 Sep 2020 07:05  Phos  1.7     09-27  Mg     2.2     09-27    
NP Note discussed with  primary attending    Patient is a 65y old  Male who presents with a chief complaint of Bright red blood per rectum (23 Sep 2020 23:55)      INTERVAL HPI/OVERNIGHT EVENTS: no new complaints    MEDICATIONS  (STANDING):  bisacodyl 20 milliGRAM(s) Oral at bedtime  ciprofloxacin   IVPB      ciprofloxacin   IVPB 400 milliGRAM(s) IV Intermittent once  insulin lispro (HumaLOG) corrective regimen sliding scale   SubCutaneous every 6 hours  lactated ringers. 1000 milliLiter(s) (80 mL/Hr) IV Continuous <Continuous>  lactated ringers. 1000 milliLiter(s) (80 mL/Hr) IV Continuous <Continuous>  metroNIDAZOLE  IVPB      metroNIDAZOLE  IVPB 500 milliGRAM(s) IV Intermittent once  metroNIDAZOLE  IVPB 500 milliGRAM(s) IV Intermittent every 8 hours    MEDICATIONS  (PRN):      __________________________________________________  REVIEW OF SYSTEMS:    CONSTITUTIONAL: No fever,   EYES: no acute visual disturbances  NECK: No pain or stiffness  RESPIRATORY: No cough; No shortness of breath  CARDIOVASCULAR: No chest pain, no palpitations  GASTROINTESTINAL: No pain. No nausea or vomiting; BRBPER   NEUROLOGICAL: No headache or numbness, no tremors  MUSCULOSKELETAL: No joint pain, no muscle pain  GENITOURINARY: no dysuria, no frequency, no hesitancy  PSYCHIATRY: no depression , no anxiety  ALL OTHER  ROS negative        Vital Signs Last 24 Hrs  T(C): 36.9 (24 Sep 2020 11:37), Max: 37.6 (23 Sep 2020 18:48)  T(F): 98.5 (24 Sep 2020 11:37), Max: 99.6 (23 Sep 2020 18:48)  HR: 16 (24 Sep 2020 11:37) (16 - 84)  BP: 105/86 (24 Sep 2020 11:37) (105/86 - 157/101)  BP(mean): --  RR: 16 (24 Sep 2020 11:37) (16 - 18)  SpO2: 99% (24 Sep 2020 11:37) (94% - 99%)    ________________________________________________  PHYSICAL EXAM:  GENERAL: NAD  HEENT: Normocephalic;  conjunctivae and sclerae clear; moist mucous membranes;   NECK : supple  CHEST/LUNG: Clear to ausculitation bilaterally with good air entry   HEART: S1 S2  regular; no murmurs, gallops or rubs  ABDOMEN: Soft, Nontender, Nondistended; Bowel sounds present  EXTREMITIES: no cyanosis; no edema; no calf tenderness  SKIN: warm and dry; no rash  NERVOUS SYSTEM:  Awake and alert; Oriented  to place, person and time ; no new deficits    _________________________________________________  LABS:                        11.3   7.01  )-----------( 248      ( 24 Sep 2020 06:56 )             35.2     09-24    142  |  106  |  16  ----------------------------<  147<H>  4.6   |  30  |  0.90    Ca    9.1      24 Sep 2020 06:56  Phos  3.2     09-24  Mg     2.2     09-24    TPro  7.1  /  Alb  3.7  /  TBili  0.3  /  DBili  x   /  AST  16  /  ALT  19  /  AlkPhos  65  09-23    PT/INR - ( 23 Sep 2020 10:48 )   PT: 12.1 sec;   INR: 1.04 ratio         PTT - ( 23 Sep 2020 10:48 )  PTT:26.6 sec    CAPILLARY BLOOD GLUCOSE      POCT Blood Glucose.: 165 mg/dL (24 Sep 2020 12:04)  POCT Blood Glucose.: 152 mg/dL (24 Sep 2020 06:18)  POCT Blood Glucose.: 143 mg/dL (24 Sep 2020 00:01)  POCT Blood Glucose.: 141 mg/dL (23 Sep 2020 21:22)        RADIOLOGY & ADDITIONAL TESTS:    Imaging Personally Reviewed:  YES/NO    Consultant(s) Notes Reviewed:   YES/ No    Care Discussed with Consultants :     Plan of care was discussed with patient and /or primary care giver; all questions and concerns were addressed and care was aligned with patient's wishes.    
Pt is now s/p sigmoid resection this morning. Laying in bed. Reports post-op pain.    MEDICATIONS  (STANDING):  dextrose 5%. 1000 milliLiter(s) (50 mL/Hr) IV Continuous <Continuous>  dextrose 50% Injectable 12.5 Gram(s) IV Push once  dextrose 50% Injectable 25 Gram(s) IV Push once  dextrose 50% Injectable 25 Gram(s) IV Push once  insulin lispro (HumaLOG) corrective regimen sliding scale   SubCutaneous three times a day before meals  pantoprazole  Injectable 40 milliGRAM(s) IV Push daily  sodium chloride 0.9% with potassium chloride 20 mEq/L 1000 milliLiter(s) (125 mL/Hr) IV Continuous <Continuous>    Vitals: T(C): 36.4 (09-25-20 @ 12:21)  T(F): 97.6 (09-25-20 @ 12:21), Max: 98.2 (09-25-20 @ 08:30)  HR: 68 (09-25-20 @ 12:21) (68 - 91)  BP: 124/67 (09-25-20 @ 12:21) (92/80 - 142/75)    Gen: NAD  CVS: S1/S2  Chest: CTALB  Abd: S/mild tenderness after surgery; surgical dressing on the abd                        10.2   10.74 )-----------( 202      ( 25 Sep 2020 07:21 )             29.6   09-25    139  |  107  |  12  ----------------------------<  155<H>  4.1   |  28  |  0.94    Ca    8.4      25 Sep 2020 08:24  Phos  3.2     09-24  Mg     2.2     09-24    
Subjective and Objective   Pt seen at bedside    Patient is a 65y old  Male who presents with a chief complaint of Bright red blood per rectum (30 Sep 2020 05:41)      Interval HPI/Overnight events:   No acute events. Admits to flatus. Denies BM. Ambulating well. Tolerating reg diet.     Vital Signs Last 24 Hrs  T(C): 36.6 (30 Sep 2020 05:23), Max: 36.8 (29 Sep 2020 21:24)  T(F): 97.8 (30 Sep 2020 05:23), Max: 98.2 (29 Sep 2020 21:24)  HR: 68 (30 Sep 2020 05:23) (68 - 85)  BP: 119/69 (30 Sep 2020 05:23) (119/69 - 121/74)  BP(mean): --  RR: 17 (30 Sep 2020 05:23) (17 - 18)  SpO2: 97% (30 Sep 2020 05:23) (96% - 97%)    Physical Exam:    Gen: AAOX3, mild distress,   General: awake, alert, NAD, appears comfortable  Respiratory: nonlabored breathing  Abdomen: Soft nondistended, nontender, EVE in place with good seal  Ext: No edema      MEDICATIONS  (STANDING):  dextrose 5%. 1000 milliLiter(s) (50 mL/Hr) IV Continuous <Continuous>  dextrose 50% Injectable 12.5 Gram(s) IV Push once  dextrose 50% Injectable 25 Gram(s) IV Push once  dextrose 50% Injectable 25 Gram(s) IV Push once  heparin   Injectable 5000 Unit(s) SubCutaneous every 8 hours  insulin lispro (HumaLOG) corrective regimen sliding scale   SubCutaneous three times a day before meals  pantoprazole  Injectable 40 milliGRAM(s) IV Push daily  sodium chloride 0.9% with potassium chloride 20 mEq/L 1000 milliLiter(s) (125 mL/Hr) IV Continuous <Continuous>    MEDICATIONS  (PRN):  acetaminophen   Tablet .. 650 milliGRAM(s) Oral every 6 hours PRN Moderate Pain (4 - 6)  dextrose 40% Gel 15 Gram(s) Oral once PRN Blood Glucose LESS THAN 70 milliGRAM(s)/deciliter  diphenhydrAMINE 25 milliGRAM(s) Oral every 6 hours PRN Rash and/or Itching  glucagon  Injectable 1 milliGRAM(s) IntraMuscular once PRN Glucose LESS THAN 70 milligrams/deciliter  traMADol 25 milliGRAM(s) Oral every 6 hours PRN Severe Pain (7 - 10)      Labs:                          9.0    7.08  )-----------( 275      ( 28 Sep 2020 07:42 )             27.7     09-28    138  |  106  |  4<L>  ----------------------------<  119<H>  3.8   |  28  |  0.73    Ca    8.5      28 Sep 2020 07:42  Phos  2.9     09-28  Mg     2.2     09-28      
Surgery Post-Op Note    Pt s/p laparoscopic assisted sigmoid colon resection 9/25; pt seen and examined at bedside, admits to incisional pain, denies nausea or vomiting, no SOB or CP; pt is NPO         Vital Signs Last 24 Hrs  T(C): 37.1 (25 Sep 2020 14:20), Max: 37.1 (25 Sep 2020 14:20)  T(F): 98.7 (25 Sep 2020 14:20), Max: 98.7 (25 Sep 2020 14:20)  HR: 73 (25 Sep 2020 14:20) (68 - 91)  BP: 113/68 (25 Sep 2020 14:20) (92/80 - 142/75)  BP(mean): 92 (25 Sep 2020 11:46) (85 - 93)  RR: 18 (25 Sep 2020 14:20) (11 - 22)  SpO2: 100% (25 Sep 2020 14:20) (95% - 100%)    Physical Exam:   GEN: AAOx3, No acute distress  ABD: Soft, ND, incisional TTP, dressing C/D/I   Extremities: non edematous, no calf pain bilaterally        09-25 @ 07:01  -  09-25 @ 18:21  --------------------------------------------------------  IN: 1600 mL / OUT: 275 mL / NET: 1325 mL

## 2020-09-30 NOTE — DISCHARGE NOTE PROVIDER - HOSPITAL COURSE
Patient is a 65 year old male with PMHx of diabetes, extensive smoking history presenting with chief complaint of bright red blood per rectum. Patient first noticed blood when he was wiping after a bowel movement and noted red streaks. Later on in the day, patient noted red blood dripping from his rectum while urinating. Patient has no other complaints. Denied fever, chills cough, SOB, chest pain, abdominal pain, constipation, diarrhea, change in stool consistency, weight loss.    IN the ED, patient was hemodynamically stable, hemoglobin of 12.6. Patient noted to have a bowel movement in the ED that turned the toilet water red. CT abd was done that showed a mass in the sigmoid colon. GI, Dr. Montez was consulted and patient was started on bowel prep for colonoscopy.      Pt had colonoscopy and an endoscopic snare was placed on large sigmoid mass. Endoscopic snare stuck on head of mass. General Surgery   was consulted for evaluation and treatment.  Pt had laparoscopic assisted sigmoid colon resection on 9/25. Postoperative course was uncomplicated.   Pt is stable for discharge home with pain medication.

## 2020-09-30 NOTE — DISCHARGE NOTE PROVIDER - CARE PROVIDERS DIRECT ADDRESSES
[email or web address]

## 2020-09-30 NOTE — PROGRESS NOTE ADULT - REASON FOR ADMISSION
Bright red blood per rectum

## 2020-09-30 NOTE — DISCHARGE NOTE PROVIDER - PROVIDER TOKENS
PROVIDER:[TOKEN:[[id number]],FOLLOWUP:[1 week]],PROVIDER:[TOKEN:[[id number]],FOLLOWUP:[1-3 days]]

## 2020-09-30 NOTE — PROGRESS NOTE ADULT - ASSESSMENT
65M s/p lap assisted sigmoid resection 9/25, doing well, tolerating regular diet    - d/c planning per primary team once pt has BM  - pain control as needed  - oob, ambulate, PT  - continue dvt/gi ppx

## 2020-10-05 PROBLEM — Z00.00 ENCOUNTER FOR PREVENTIVE HEALTH EXAMINATION: Status: ACTIVE | Noted: 2020-10-05

## 2020-10-09 PROBLEM — E11.9 TYPE 2 DIABETES MELLITUS WITHOUT COMPLICATIONS: Chronic | Status: ACTIVE | Noted: 2020-09-23

## 2020-10-09 PROBLEM — Z87.891 PERSONAL HISTORY OF NICOTINE DEPENDENCE: Chronic | Status: ACTIVE | Noted: 2020-09-25

## 2020-10-12 ENCOUNTER — APPOINTMENT (OUTPATIENT)
Dept: SURGERY | Facility: CLINIC | Age: 65
End: 2020-10-12
Payer: SELF-PAY

## 2020-10-12 VITALS
BODY MASS INDEX: 30.76 KG/M2 | OXYGEN SATURATION: 96 % | SYSTOLIC BLOOD PRESSURE: 106 MMHG | HEIGHT: 67 IN | DIASTOLIC BLOOD PRESSURE: 72 MMHG | HEART RATE: 97 BPM | WEIGHT: 196 LBS

## 2020-10-12 DIAGNOSIS — Z83.3 FAMILY HISTORY OF DIABETES MELLITUS: ICD-10-CM

## 2020-10-12 DIAGNOSIS — Z63.5 DISRUPTION OF FAMILY BY SEPARATION AND DIVORCE: ICD-10-CM

## 2020-10-12 DIAGNOSIS — Z86.39 PERSONAL HISTORY OF OTHER ENDOCRINE, NUTRITIONAL AND METABOLIC DISEASE: ICD-10-CM

## 2020-10-12 DIAGNOSIS — E11.9 TYPE 2 DIABETES MELLITUS W/OUT COMPLICATIONS: ICD-10-CM

## 2020-10-12 PROCEDURE — 99024 POSTOP FOLLOW-UP VISIT: CPT

## 2020-10-12 RX ORDER — METFORMIN HYDROCHLORIDE 1000 MG/1
1000 TABLET, COATED ORAL
Qty: 60 | Refills: 0 | Status: ACTIVE | COMMUNITY
Start: 2020-05-11

## 2020-10-12 SDOH — SOCIAL STABILITY - SOCIAL INSECURITY: DISRUPTION OF FAMILY BY SEPARATION AND DIVORCE: Z63.5

## 2020-10-12 NOTE — HISTORY OF PRESENT ILLNESS
[de-identified] : Mr. WALSH  is s/p Laparoscopicassisted sigmoid colon resection on 09/23/2020.  The patient was found to have a large polyp in the sigmoid colon.  The area was previously tattooed by GI.  The patient was also found to have a wire  that was stuck to the polyp. 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 Today Mr. WALSH offers no complaints. patient reports no fever, chills,  or  pain. His  surgical wounds are  healing well. No signs of inflammation, infection or exudate. Patient reports good bowel movements and appetite.

## 2020-10-12 NOTE — ASSESSMENT
[FreeTextEntry1] : Mr. WALSH is doing well, with excellent post-operative recovery. The surgical incision is healing well and as expected. There is no evidence of infection or complication, and patient is progressing as expected. Post-operative wound care, activity, restrictions and precautions reinforced.  Pathology results were discussed in details. Patient's questions and concerns addressed to patient's satisfaction.\par

## 2020-10-12 NOTE — PHYSICAL EXAM
[de-identified] : He  is alert, well-groomed, and cheerful.\par   [de-identified] : Surgical wounds are  healing well.   no signs of  inflammation or infection.

## 2020-10-12 NOTE — DATA REVIEWED
[FreeTextEntry1] : \par CHUY WALSHSOV             5\par \par \par \par Surgical Final Report\par \par \par \par \par Final Diagnosis\par \par 1. Sigmoid mass; laparoscopic sigmoid colon resection:\par - Moderately differentiated colonic adenocarcinoma with mucinous\par features arising in association with tubulovillous adenoma and\par measuring 4.3 x 3.0 x 2.8 cm.\par - Colonic adenocarcinoma invades the submucosa, pT1 and is\par equidistally located from the proximal and distal colectomy\par margins (including primary and secondary margins).\par - Lymphovascular and perineural invasion is not identified.\par - Twelve pericolic lymph nodes are negative for metastatic\par carcinoma, 0/12, pN0.\par - Diverticulosis with acute diverticulitis.\par - See synoptic report.\par \par 2. Distal margin; resection:\par - Colonic wall segment, negative for malignancy.\par \par 3. Anastomosis donut; resection:\par - Colonic wall segments, negative for malignancy.\par \par Verified by: Jane Cooper MD\par (Electronic Signature)\par Reported on: 09/30/20 16:48 EDT, [facility],\par [address], [place] [address]\par Phone: [phone]   Fax: [phone]\par _________________________________________________________________\par \par Synoptic Summary\par \par Surgical Pathology Cancer Case Summary\par \par Protocol posting date: February 2020

## 2020-11-30 PROBLEM — C18.9 COLON CANCER: Status: ACTIVE | Noted: 2020-10-12

## 2020-11-30 NOTE — HISTORY OF PRESENT ILLNESS
[de-identified] : Mr. WALSH  is s/p Laparoscopicassisted sigmoid colon resection on 09/23/2020.  The patient was found to have a large polyp in the sigmoid colon.    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

## 2020-12-03 ENCOUNTER — APPOINTMENT (OUTPATIENT)
Dept: SURGERY | Facility: CLINIC | Age: 65
End: 2020-12-03

## 2020-12-03 DIAGNOSIS — C18.9 MALIGNANT NEOPLASM OF COLON, UNSPECIFIED: ICD-10-CM

## 2022-10-31 ENCOUNTER — EMERGENCY (EMERGENCY)
Facility: HOSPITAL | Age: 67
LOS: 1 days | Discharge: HOME | End: 2022-10-31
Attending: STUDENT IN AN ORGANIZED HEALTH CARE EDUCATION/TRAINING PROGRAM
Payer: MEDICARE

## 2022-10-31 VITALS
OXYGEN SATURATION: 96 % | HEIGHT: 67 IN | SYSTOLIC BLOOD PRESSURE: 132 MMHG | HEART RATE: 87 BPM | WEIGHT: 199.08 LBS | RESPIRATION RATE: 16 BRPM | DIASTOLIC BLOOD PRESSURE: 87 MMHG | TEMPERATURE: 98 F

## 2022-10-31 LAB
APPEARANCE UR: CLEAR — SIGNIFICANT CHANGE UP
BILIRUB UR-MCNC: NEGATIVE — SIGNIFICANT CHANGE UP
COLOR SPEC: YELLOW — SIGNIFICANT CHANGE UP
DIFF PNL FLD: NEGATIVE — SIGNIFICANT CHANGE UP
GLUCOSE UR QL: 1000 MG/DL
HIV 1 & 2 AB SERPL IA.RAPID: SIGNIFICANT CHANGE UP
KETONES UR-MCNC: NEGATIVE — SIGNIFICANT CHANGE UP
LEUKOCYTE ESTERASE UR-ACNC: NEGATIVE — SIGNIFICANT CHANGE UP
NITRITE UR-MCNC: NEGATIVE — SIGNIFICANT CHANGE UP
PH UR: 5 — SIGNIFICANT CHANGE UP (ref 5–8)
PROT UR-MCNC: NEGATIVE — SIGNIFICANT CHANGE UP
RBC CASTS # UR COMP ASSIST: SIGNIFICANT CHANGE UP /HPF (ref 0–2)
SP GR SPEC: 1.01 — SIGNIFICANT CHANGE UP (ref 1.01–1.02)
UROBILINOGEN FLD QL: NEGATIVE — SIGNIFICANT CHANGE UP
WBC UR QL: SIGNIFICANT CHANGE UP /HPF (ref 0–5)

## 2022-10-31 PROCEDURE — 36415 COLL VENOUS BLD VENIPUNCTURE: CPT

## 2022-10-31 PROCEDURE — 99283 EMERGENCY DEPT VISIT LOW MDM: CPT

## 2022-10-31 PROCEDURE — 86780 TREPONEMA PALLIDUM: CPT

## 2022-10-31 PROCEDURE — 87591 N.GONORRHOEAE DNA AMP PROB: CPT

## 2022-10-31 PROCEDURE — 87086 URINE CULTURE/COLONY COUNT: CPT

## 2022-10-31 PROCEDURE — 99284 EMERGENCY DEPT VISIT MOD MDM: CPT

## 2022-10-31 PROCEDURE — 81001 URINALYSIS AUTO W/SCOPE: CPT

## 2022-10-31 PROCEDURE — 87491 CHLMYD TRACH DNA AMP PROBE: CPT

## 2022-10-31 PROCEDURE — 86703 HIV-1/HIV-2 1 RESULT ANTBDY: CPT

## 2022-10-31 RX ORDER — NYSTATIN CREAM 100000 [USP'U]/G
1 CREAM TOPICAL
Refills: 0 | Status: DISCONTINUED | OUTPATIENT
Start: 2022-10-31 | End: 2022-11-03

## 2022-10-31 RX ADMIN — NYSTATIN CREAM 1 APPLICATION(S): 100000 CREAM TOPICAL at 14:08

## 2022-10-31 NOTE — ED PROVIDER NOTE - PHYSICAL EXAMINATION
General: well appearing male, no acute distress   HEENT: normocephalic, atraumatic   Respiratory: normal work of breathing  : shallow erosions around glans, white discharge   MSK: no swelling or tenderness of lower extremities, moving all extremities spontaneously   Skin: warm, dry   Neuro: A&Ox3  Psych: appropriate affect

## 2022-10-31 NOTE — ED PROVIDER NOTE - CLINICAL SUMMARY MEDICAL DECISION MAKING FREE TEXT BOX
67M presenting with dysuria. reports sexually active but uses condoms. lesions may be balanitis. will test for STI's and treat for balanitis given h/o DM.

## 2022-10-31 NOTE — ED PROVIDER NOTE - NSFOLLOWUPINSTRUCTIONS_ED_ALL_ED_FT
You were seen in the emergency department for painful urination.     Please follow-up with your primary care doctor in the next 24-48 hours.     Please use the ointment on your lesions 2 times a day for 1 week.     If you have any worsening symptoms, severe penile pain, testicular swelling, abdominal pain, please return to the emergency department.

## 2022-10-31 NOTE — ED PROVIDER NOTE - PATIENT PORTAL LINK FT
You can access the FollowMyHealth Patient Portal offered by [facility] by registering at the following website: [email or web address]. By joining [employer]’s FollowMyHealth portal, you will also be able to view your health information using other applications (apps) compatible with our system.

## 2022-10-31 NOTE — ED PROVIDER NOTE - OBJECTIVE STATEMENT
67M presenting with painful urination. patient reports he developed a "problem" with his penis so he began putting iodine on it 3-4 days ago. shortly after that he developed burning when he peed. no fever, abdominal pain, nausea, vomiting.

## 2022-11-01 LAB
C TRACH RRNA SPEC QL NAA+PROBE: SIGNIFICANT CHANGE UP
CULTURE RESULTS: SIGNIFICANT CHANGE UP
N GONORRHOEA RRNA SPEC QL NAA+PROBE: SIGNIFICANT CHANGE UP
SPECIMEN SOURCE: SIGNIFICANT CHANGE UP
SPECIMEN SOURCE: SIGNIFICANT CHANGE UP
T PALLIDUM AB TITR SER: NEGATIVE — SIGNIFICANT CHANGE UP

## 2024-12-20 NOTE — ED ADULT NURSE NOTE - CAS ELECT INFOMATION PROVIDED
The following orders from PAT Sotelo have been discontinued due to provider departure and re-entered under Dr Montiel: PT/INR, AFP, CBC, CMP and US Vascular Portal Hepatic Duplex.   DC instructions